# Patient Record
Sex: FEMALE | Race: WHITE | Employment: FULL TIME | URBAN - METROPOLITAN AREA
[De-identification: names, ages, dates, MRNs, and addresses within clinical notes are randomized per-mention and may not be internally consistent; named-entity substitution may affect disease eponyms.]

---

## 2017-08-03 ENCOUNTER — INITIAL CONSULT (OUTPATIENT)
Dept: SURGERY | Age: 27
End: 2017-08-03

## 2017-08-03 VITALS — WEIGHT: 118 LBS | DIASTOLIC BLOOD PRESSURE: 70 MMHG | BODY MASS INDEX: 19.05 KG/M2 | SYSTOLIC BLOOD PRESSURE: 118 MMHG

## 2017-08-03 DIAGNOSIS — K42.9 UMBILICAL HERNIA WITHOUT OBSTRUCTION AND WITHOUT GANGRENE: Primary | ICD-10-CM

## 2017-08-03 PROCEDURE — 99213 OFFICE O/P EST LOW 20 MIN: CPT | Performed by: SURGERY

## 2017-08-03 ASSESSMENT — ENCOUNTER SYMPTOMS
EYES NEGATIVE: 1
ALLERGIC/IMMUNOLOGIC NEGATIVE: 1
ANAL BLEEDING: 1
RECTAL PAIN: 1
RESPIRATORY NEGATIVE: 1
BACK PAIN: 1
ABDOMINAL PAIN: 1

## 2017-08-28 ENCOUNTER — TELEPHONE (OUTPATIENT)
Dept: SURGERY | Age: 27
End: 2017-08-28

## 2017-09-01 ENCOUNTER — HOSPITAL ENCOUNTER (OUTPATIENT)
Dept: SURGERY | Age: 27
Discharge: HOME | End: 2017-09-01
Attending: SURGERY | Admitting: SURGERY

## 2017-09-01 VITALS
OXYGEN SATURATION: 98 % | WEIGHT: 117.95 LBS | HEART RATE: 76 BPM | RESPIRATION RATE: 16 BRPM | SYSTOLIC BLOOD PRESSURE: 94 MMHG | DIASTOLIC BLOOD PRESSURE: 64 MMHG | HEIGHT: 66 IN | BODY MASS INDEX: 18.96 KG/M2 | TEMPERATURE: 97.5 F

## 2017-09-01 LAB — HCG(URINE) PREGNANCY TEST: NEGATIVE

## 2017-09-01 PROCEDURE — 49585 REPAIR UMBILICAL HERN,5+Y/O,REDUC: CPT | Performed by: SURGERY

## 2017-09-01 RX ORDER — CEFAZOLIN SODIUM 2 G/100ML
INJECTION, SOLUTION INTRAVENOUS
Status: DISPENSED
Start: 2017-09-01 | End: 2017-09-01

## 2017-09-01 RX ORDER — FENTANYL CITRATE 50 UG/ML
50 INJECTION, SOLUTION INTRAMUSCULAR; INTRAVENOUS EVERY 5 MIN PRN
Status: DISCONTINUED | OUTPATIENT
Start: 2017-09-01 | End: 2017-09-02 | Stop reason: HOSPADM

## 2017-09-01 RX ORDER — DIPHENHYDRAMINE HYDROCHLORIDE 50 MG/ML
12.5 INJECTION INTRAMUSCULAR; INTRAVENOUS
Status: ACTIVE | OUTPATIENT
Start: 2017-09-01 | End: 2017-09-01

## 2017-09-01 RX ORDER — FENTANYL CITRATE 50 UG/ML
25 INJECTION, SOLUTION INTRAMUSCULAR; INTRAVENOUS EVERY 5 MIN PRN
Status: DISCONTINUED | OUTPATIENT
Start: 2017-09-01 | End: 2017-09-02 | Stop reason: HOSPADM

## 2017-09-01 RX ORDER — HYDROMORPHONE HCL 110MG/55ML
0.25 PATIENT CONTROLLED ANALGESIA SYRINGE INTRAVENOUS EVERY 5 MIN PRN
Status: DISCONTINUED | OUTPATIENT
Start: 2017-09-01 | End: 2017-09-02 | Stop reason: HOSPADM

## 2017-09-01 RX ORDER — MEPERIDINE HYDROCHLORIDE 25 MG/ML
12.5 INJECTION INTRAMUSCULAR; INTRAVENOUS; SUBCUTANEOUS EVERY 5 MIN PRN
Status: DISCONTINUED | OUTPATIENT
Start: 2017-09-01 | End: 2017-09-02 | Stop reason: HOSPADM

## 2017-09-01 RX ORDER — SODIUM CHLORIDE 0.9 % (FLUSH) 0.9 %
10 SYRINGE (ML) INJECTION EVERY 12 HOURS SCHEDULED
Status: DISCONTINUED | OUTPATIENT
Start: 2017-09-01 | End: 2017-09-02 | Stop reason: HOSPADM

## 2017-09-01 RX ORDER — CEFAZOLIN SODIUM 2 G/100ML
2 INJECTION, SOLUTION INTRAVENOUS
Status: COMPLETED | OUTPATIENT
Start: 2017-09-01 | End: 2017-09-01

## 2017-09-01 RX ORDER — PROMETHAZINE HYDROCHLORIDE 25 MG/ML
6.25 INJECTION, SOLUTION INTRAMUSCULAR; INTRAVENOUS EVERY 30 MIN PRN
Status: DISCONTINUED | OUTPATIENT
Start: 2017-09-01 | End: 2017-09-02 | Stop reason: HOSPADM

## 2017-09-01 RX ORDER — HYDROMORPHONE HCL 110MG/55ML
0.5 PATIENT CONTROLLED ANALGESIA SYRINGE INTRAVENOUS EVERY 5 MIN PRN
Status: DISCONTINUED | OUTPATIENT
Start: 2017-09-01 | End: 2017-09-02 | Stop reason: HOSPADM

## 2017-09-01 RX ORDER — SODIUM CHLORIDE 0.9 % (FLUSH) 0.9 %
10 SYRINGE (ML) INJECTION PRN
Status: DISCONTINUED | OUTPATIENT
Start: 2017-09-01 | End: 2017-09-02 | Stop reason: HOSPADM

## 2017-09-01 RX ORDER — LIDOCAINE HYDROCHLORIDE 10 MG/ML
1 INJECTION, SOLUTION EPIDURAL; INFILTRATION; INTRACAUDAL; PERINEURAL
Status: ACTIVE | OUTPATIENT
Start: 2017-09-01 | End: 2017-09-01

## 2017-09-01 RX ORDER — SODIUM CHLORIDE 9 MG/ML
INJECTION, SOLUTION INTRAVENOUS CONTINUOUS
Status: DISCONTINUED | OUTPATIENT
Start: 2017-09-01 | End: 2017-09-02 | Stop reason: HOSPADM

## 2017-09-01 RX ORDER — OXYCODONE HYDROCHLORIDE AND ACETAMINOPHEN 5; 325 MG/1; MG/1
1 TABLET ORAL EVERY 4 HOURS PRN
Qty: 30 TABLET | Refills: 0 | Status: SHIPPED | OUTPATIENT
Start: 2017-09-01 | End: 2017-09-08

## 2017-09-01 RX ADMIN — Medication 0.5 MG: at 11:42

## 2017-09-01 RX ADMIN — CEFAZOLIN SODIUM 2 G: 2 INJECTION, SOLUTION INTRAVENOUS at 10:26

## 2017-09-01 RX ADMIN — Medication 0.5 MG: at 11:56

## 2017-09-01 RX ADMIN — SODIUM CHLORIDE: 9 INJECTION, SOLUTION INTRAVENOUS at 09:31

## 2017-09-01 ASSESSMENT — PAIN DESCRIPTION - PAIN TYPE
TYPE: SURGICAL PAIN
TYPE: SURGICAL PAIN

## 2017-09-01 ASSESSMENT — PAIN SCALES - GENERAL
PAINLEVEL_OUTOF10: 7
PAINLEVEL_OUTOF10: 0
PAINLEVEL_OUTOF10: 0
PAINLEVEL_OUTOF10: 8
PAINLEVEL_OUTOF10: 8
PAINLEVEL_OUTOF10: 0
PAINLEVEL_OUTOF10: 8

## 2017-09-01 ASSESSMENT — PAIN - FUNCTIONAL ASSESSMENT: PAIN_FUNCTIONAL_ASSESSMENT: 0-10

## 2017-09-14 ENCOUNTER — OFFICE VISIT (OUTPATIENT)
Dept: SURGERY | Age: 27
End: 2017-09-14

## 2017-09-14 VITALS — DIASTOLIC BLOOD PRESSURE: 60 MMHG | SYSTOLIC BLOOD PRESSURE: 110 MMHG | WEIGHT: 119 LBS | BODY MASS INDEX: 19.21 KG/M2

## 2017-09-14 DIAGNOSIS — K42.9 UMBILICAL HERNIA WITHOUT OBSTRUCTION AND WITHOUT GANGRENE: Primary | ICD-10-CM

## 2017-09-14 PROCEDURE — 99024 POSTOP FOLLOW-UP VISIT: CPT | Performed by: SURGERY

## 2020-07-07 ENCOUNTER — OFFICE VISIT (OUTPATIENT)
Dept: PRIMARY CARE CLINIC | Age: 30
End: 2020-07-07

## 2020-07-07 PROCEDURE — 99211 OFF/OP EST MAY X REQ PHY/QHP: CPT | Performed by: FAMILY MEDICINE

## 2020-07-07 NOTE — PATIENT INSTRUCTIONS
Steps to help prevent the spread of COVID-19 if you are sick  SOURCE - https://aguirre-lee.info/. html     Stay home except to get medical care   ; Stay home: People who are mildly ill with COVID-19 are able to isolate at home during their illness. You should restrict activities outside your home, except for getting medical care.   ; Avoid public areas: Do not go to work, school, or public areas.   ; Avoid public transportation: Avoid using public transportation, ride-sharing, or taxis.  ; Separate yourself from other people and animals in your home   ; Stay away from others: As much as possible, you should stay in a specific room and away from other people in your home. Also, you should use a separate bathroom, if available.   ; Limit contact with pets & animals: You should restrict contact with pets and other animals while you are sick with COVID-19, just like you would around other people. Although there have not been reports of pets or other animals becoming sick with COVID-19, it is still recommended that people sick with COVID-19 limit contact with animals until more information is known about the virus. ; When possible, have another member of your household care for your animals while you are sick. If you are sick with COVID-19, avoid contact with your pet, including petting, snuggling, being kissed or licked, and sharing food. If you must care for your pet or be around animals while you are sick, wash your hands before and after you interact with pets and wear a facemask. See COVID-19 and Animals for more information. Other considerations   The ill person should eat/be fed in their room if possible. Non-disposable  items used should be handled with gloves and washed with hot water or in a . Clean hands after handling used  items.  If possible, dedicate a lined trash can for the ill person.  Use gloves when removing garbage hands are visibly dirty.   ; Avoid touching: Avoid touching your eyes, nose, and mouth with unwashed hands. Handwashing Tips   ; Wet your hands with clean, running water (warm or cold), turn off the tap, and apply soap.  ; Lather your hands by rubbing them together with the soap. Lather the backs of your hands, between your fingers, and under your nails. ; Scrub your hands for at least 20 seconds. Need a timer? Hum the Carlyle from beginning to end twice.  ; Rinse your hands well under clean, running water.  ; Dry your hands using a clean towel or air dry them. Avoid sharing personal household items   ; Do not share: You should not share dishes, drinking glasses, cups, eating utensils, towels, or bedding with other people or pets in your home.   ; Wash thoroughly after use: After using these items, they should be washed thoroughly with soap and water. Clean all high-touch surfaces everyday   ; Clean and disinfect: Practice routine cleaning of high touch surfaces.  ; High touch surfaces include counters, tabletops, doorknobs, bathroom fixtures, toilets, phones, keyboards, tablets, and bedside tables.  ; Disinfect areas with bodily fluids: Also, clean any surfaces that may have blood, stool, or body fluids on them.   ; Household : Use a household cleaning spray or wipe, according to the label instructions. Labels contain instructions for safe and effective use of the cleaning product including precautions you should take when applying the product, such as wearing gloves and making sure you have good ventilation during use of the product.     Monitor your symptoms   Seek medical attention: Seek prompt medical attention if your illness is worsening     (e.g., difficulty breathing).   ; Call your doctor: Before seeking care, call your healthcare provider and tell them that you have, or are being evaluated for, COVID-19.   ; Wear a facemask when sick: Put on a facemask before you enter the

## 2020-07-07 NOTE — PROGRESS NOTES
Eva Robison received a viral test for COVID-19. They were educated on isolation and quarantine as appropriate. For any symptoms, they were directed to seek care from their PCP, given contact information to establish with a doctor, directed to an urgent care or the emergency room.

## 2020-07-10 LAB
SARS-COV-2: NOT DETECTED
SOURCE: NORMAL

## 2020-09-21 ENCOUNTER — TELEPHONE (OUTPATIENT)
Dept: INTERNAL MEDICINE CLINIC | Age: 30
End: 2020-09-21

## 2020-09-21 NOTE — TELEPHONE ENCOUNTER
----- Message from Blair Rowland sent at 9/21/2020  2:06 PM EDT -----  Subject: Message to Provider    QUESTIONS  Information for Provider? pt wants to know if dr Sierra Gray is willing to   take her on as a new pt. please f/u  ---------------------------------------------------------------------------  --------------  [address]  What is the best way for the office to contact you? OK to leave message on   voicemail  Preferred Call Back Phone Number? [phone]  ---------------------------------------------------------------------------  --------------  SCRIPT ANSWERS  Relationship to Patient?  Self

## 2020-09-25 ENCOUNTER — OFFICE VISIT (OUTPATIENT)
Dept: INTERNAL MEDICINE CLINIC | Age: 30
End: 2020-09-25
Payer: COMMERCIAL

## 2020-09-25 VITALS
TEMPERATURE: 98.2 F | HEART RATE: 80 BPM | DIASTOLIC BLOOD PRESSURE: 62 MMHG | BODY MASS INDEX: 19.61 KG/M2 | SYSTOLIC BLOOD PRESSURE: 114 MMHG | WEIGHT: 122 LBS | HEIGHT: 66 IN

## 2020-09-25 PROCEDURE — 99385 PREV VISIT NEW AGE 18-39: CPT | Performed by: FAMILY MEDICINE

## 2020-09-25 RX ORDER — VARENICLINE TARTRATE
KIT
Qty: 1 BOX | Refills: 0 | Status: SHIPPED | OUTPATIENT
Start: 2020-09-25 | End: 2020-12-02 | Stop reason: ALTCHOICE

## 2020-09-25 RX ORDER — ESCITALOPRAM OXALATE 20 MG/1
20 TABLET ORAL DAILY
COMMUNITY
Start: 2020-09-16

## 2020-09-25 SDOH — ECONOMIC STABILITY: TRANSPORTATION INSECURITY
IN THE PAST 12 MONTHS, HAS THE LACK OF TRANSPORTATION KEPT YOU FROM MEDICAL APPOINTMENTS OR FROM GETTING MEDICATIONS?: NO

## 2020-09-25 SDOH — ECONOMIC STABILITY: TRANSPORTATION INSECURITY
IN THE PAST 12 MONTHS, HAS LACK OF TRANSPORTATION KEPT YOU FROM MEETINGS, WORK, OR FROM GETTING THINGS NEEDED FOR DAILY LIVING?: NO

## 2020-09-25 SDOH — ECONOMIC STABILITY: FOOD INSECURITY: WITHIN THE PAST 12 MONTHS, THE FOOD YOU BOUGHT JUST DIDN'T LAST AND YOU DIDN'T HAVE MONEY TO GET MORE.: NEVER TRUE

## 2020-09-25 SDOH — ECONOMIC STABILITY: INCOME INSECURITY: HOW HARD IS IT FOR YOU TO PAY FOR THE VERY BASICS LIKE FOOD, HOUSING, MEDICAL CARE, AND HEATING?: NOT HARD AT ALL

## 2020-09-25 SDOH — ECONOMIC STABILITY: FOOD INSECURITY: WITHIN THE PAST 12 MONTHS, YOU WORRIED THAT YOUR FOOD WOULD RUN OUT BEFORE YOU GOT MONEY TO BUY MORE.: NEVER TRUE

## 2020-09-25 NOTE — PROGRESS NOTES
Chief Complaint   Patient presents with    Annual Exam     Re-establish care. I saw her for 1 visit in Jan 2014. She has been seeing her OB/gyn physician. PREVENTATIVE VISIT AND EXAM      She is on escitalopram and it is working well. 1 suicide attempt when age 13. She took her nephew in and he is not at all stressful and a model teenager, but communicating with his mother = her sister is very stressful. Her sister is not in a good place right now. She c/o Waking up in the middle of the night gasping for air. She has to tell herself to breathe when she wakes up and uses the term that she has \"sleep apnea. \"     During the day she has to catch herself trying to get a big full breath and some chest pressure. This \"sleep apnea\" did happen 3 days in a row. She was drinking 2 pots of coffee per day. She decided this was not good for her so Cut out caffeinated coffee most of the day. Since she cut the caffeine she is doing better. Still doing a full pot but gradually cutting back and starting to mix decaf with caffeine and weaning down. See ROS. Exercise:  None but has 2 young boys, the youngest is 12 months old. She also does housework.         Patient Active Problem List   Diagnosis    Recurrent major depression in remission ([address]    Umbilical hernia    Umbilical hernia without obstruction and without gangrene       Past Medical History:   Diagnosis Date    Recurrent depression ([address]     Strep throat      Past Surgical History:   Procedure Laterality Date    DENTAL SURGERY      UMBILICAL HERNIA REPAIR  29/88/6861     OPEN UMBILICAL HERNIA REPAIR WITH MESH     Family History   Problem Relation Age of Onset    Stroke Mother     Depression Mother     High Blood Pressure Father     Heart Disease Father         CAD & PAD    Stroke Father     Diabetes Father     Asthma Sister     Migraines Other      Social History     Tobacco Use    Smoking status: Current Every Day Smoker Packs/day: 0.25     Years: 7.00     Pack years: 1.75     Types: Cigarettes    Smokeless tobacco: Never Used    Tobacco comment: 1.5 ppd for 1 year; otherwise lighter. Substance Use Topics    Alcohol use: Yes     Comment: 2-7 drinks a week     Drug use: No       Outpatient Medications Marked as Taking for the 9/25/20 encounter (Office Visit) with Juancarlos Trinh MD   Medication Sig Dispense Refill    escitalopram (LEXAPRO) 20 MG tablet Take 20 mg by mouth daily           ROS:  Full 12 system review done and all negative except for the following:   Unexplained fatigue  Excess daytime sleepiness  Chest pressure  Heart palpitations or racing  Insomnia  Depression, anxiety, worry  Back, neck and joint pain. Her hips tend to lock. Remembers having pain since he was a young teenager. Physical Exam   Vitals:    09/25/20 1425   BP: 114/62   Pulse: 80   Temp: 98.2 °F (36.8 °C)      Body mass index is 19.69 kg/m². Wt Readings from Last 3 Encounters:   09/25/20 122 lb (55.3 kg)   09/14/17 119 lb (54 kg)   09/01/17 117 lb 15.1 oz (53.5 kg)         Constitutional: Oriented to person, place, and time. Appears well-developed and well-nourished. No distress. Thin WF who appears healthy  HENT:   Head: Normocephalic and atraumatic. Ears: Hearing, tympanic membrane, external ear and ear canal normal.   Nose: Nose normal.   Mouth/Throat: Uvula is midline, oropharynx is clear and moist and mucous membranes are normal.   Eyes: Conjunctivae and EOM are normal. Pupils are equal, round, and reactive to light. No scleral icterus. Neck: Normal range of motion. Neck supple. Normal carotid pulses and no JVD present. Carotid bruit is not present. No tracheal deviation present. No mass and no thyromegaly present. Cardiovascular: Normal rate, regular rhythm, S1 normal, S2 normal, normal heart sounds and intact distal pulses. No murmur heard. Pulmonary/Chest: Effort normal and breath sounds normal. No stridor.  No respiratory distress. No decreased breath sounds. No wheezes. No rhonchi. No rales. Abdominal: Soft. Normal appearance and bowel sounds are normal. No distension, no abdominal bruit and no mass. There is no hepatomegaly. No tenderness. Musculoskeletal: Normal range of motion. No edema. Appears flexible. No joint nodules or swelling. Did not check for hyper-flexibility today. Lymphadenopathy:     No cervical adenopathy. No axillary adenopathy. No supraclavicular adenopathy. No Inguinal adenopathy. Neurological: Alert and oriented to person, place, and time. Normal reflexes. No tremor. No cranial nerve deficit. She exhibits normal muscle tone. Coordination and gait normal.   Skin: Skin is warm and dry. No rash noted. Not diaphoretic. No cyanosis. No pallor. Nails show no clubbing. Psychiatric:  Normal mood and affect. Speech is normal and behavior is normal. Cognition and memory are normal.         Assessment:      1. Annual physical exam  She says she is up to date on vaccines and gyn care. - CBC Auto Differential  - Comprehensive Metabolic Panel  - Lipid Panel  - TSH with Reflex    2. Shortness of breath  May be anxiety related. Mostly at night. R/o sleep disordered breathing. 3. Nocturnal dyspnea  - Shira Francis MD, Sleep Medicine, [facility]    4. Tobacco use  She would like to try Chantix. Her gyn would not prescribe due to her h/o depression. She does not feel like she would ever commit suicide due to her kids and . She understands to stop immediately if adverse side effects. Encouraged to DC smoking.   - varenicline (CHANTIX STARTING MONTH GERSON) 0.5 MG X 11 & 1 MG X 42 tablet; Take by mouth. Dispense: 1 box; Refill: 0    5. Bilateral hip pain  Start with x-rays and consider rheumatology consult vs return visit. Outside of today's scope   - XR HIP RIGHT (2-3 VIEWS); Future  - XR HIP LEFT (2-3 VIEWS); Future    6. Arthralgia, unspecified joint  Same as #5. - Sedimentation Rate  - NOY Reflex to Antibody Cascade  - C-Reactive Protein        Health Maintenance Due   Topic Date Due    Varicella vaccine (1 of 2 - 2-dose childhood series) 12/24/1991    Pneumococcal 0-64 years Vaccine (1 of 1 - PPSV23) 12/24/1996    Hepatitis B vaccine (2 of 3 - 3-dose primary series) 04/13/2000    HIV screen  12/24/2005    Cervical cancer screen  12/24/2011    Flu vaccine (1) 09/01/2020     Declines flu vaccine today. Plan:    See orders and patient instructions. Age-appropriate preventative issues discussed and hand out given. An electronic signature was used to authenticate this note.     --Lilliam Gonzalez MD on 9/25/2020

## 2020-09-26 LAB
A/G RATIO: 2.6 (ref 1.1–2.2)
ALBUMIN SERPL-MCNC: 4.6 G/DL (ref 3.4–5)
ALP BLD-CCNC: 83 U/L (ref 40–129)
ALT SERPL-CCNC: 9 U/L (ref 10–40)
ANION GAP SERPL CALCULATED.3IONS-SCNC: 12 MMOL/L (ref 3–16)
ANTI-NUCLEAR ANTIBODY (ANA): NEGATIVE
AST SERPL-CCNC: 20 U/L (ref 15–37)
BASOPHILS ABSOLUTE: 0 K/UL (ref 0–0.2)
BASOPHILS RELATIVE PERCENT: 0.7 %
BILIRUB SERPL-MCNC: <0.2 MG/DL (ref 0–1)
BUN BLDV-MCNC: 11 MG/DL (ref 7–20)
C-REACTIVE PROTEIN: 0.4 MG/L (ref 0–5.1)
CALCIUM SERPL-MCNC: 9.7 MG/DL (ref 8.3–10.6)
CHLORIDE BLD-SCNC: 107 MMOL/L (ref 99–110)
CHOLESTEROL, TOTAL: 128 MG/DL (ref 0–199)
CO2: 26 MMOL/L (ref 21–32)
CREAT SERPL-MCNC: 0.7 MG/DL (ref 0.6–1.1)
EOSINOPHILS ABSOLUTE: 0.1 K/UL (ref 0–0.6)
EOSINOPHILS RELATIVE PERCENT: 2.6 %
GFR AFRICAN AMERICAN: >60
GFR NON-AFRICAN AMERICAN: >60
GLOBULIN: 1.8 G/DL
GLUCOSE BLD-MCNC: 67 MG/DL (ref 70–99)
HCT VFR BLD CALC: 38.5 % (ref 36–48)
HDLC SERPL-MCNC: 63 MG/DL (ref 40–60)
HEMOGLOBIN: 12.9 G/DL (ref 12–16)
LDL CHOLESTEROL CALCULATED: 50 MG/DL
LYMPHOCYTES ABSOLUTE: 1.4 K/UL (ref 1–5.1)
LYMPHOCYTES RELATIVE PERCENT: 27.2 %
MCH RBC QN AUTO: 30.8 PG (ref 26–34)
MCHC RBC AUTO-ENTMCNC: 33.5 G/DL (ref 31–36)
MCV RBC AUTO: 91.9 FL (ref 80–100)
MONOCYTES ABSOLUTE: 0.4 K/UL (ref 0–1.3)
MONOCYTES RELATIVE PERCENT: 7.1 %
NEUTROPHILS ABSOLUTE: 3.3 K/UL (ref 1.7–7.7)
NEUTROPHILS RELATIVE PERCENT: 62.4 %
PDW BLD-RTO: 13.1 % (ref 12.4–15.4)
PLATELET # BLD: 201 K/UL (ref 135–450)
PMV BLD AUTO: 10.3 FL (ref 5–10.5)
POTASSIUM SERPL-SCNC: 4.1 MMOL/L (ref 3.5–5.1)
RBC # BLD: 4.19 M/UL (ref 4–5.2)
SEDIMENTATION RATE, ERYTHROCYTE: 6 MM/HR (ref 0–20)
SODIUM BLD-SCNC: 145 MMOL/L (ref 136–145)
TOTAL PROTEIN: 6.4 G/DL (ref 6.4–8.2)
TRIGL SERPL-MCNC: 75 MG/DL (ref 0–150)
TSH REFLEX: 0.81 UIU/ML (ref 0.27–4.2)
VLDLC SERPL CALC-MCNC: 15 MG/DL
WBC # BLD: 5.2 K/UL (ref 4–11)

## 2020-10-22 ENCOUNTER — OFFICE VISIT (OUTPATIENT)
Dept: PRIMARY CARE CLINIC | Age: 30
End: 2020-10-22
Payer: COMMERCIAL

## 2020-10-22 PROCEDURE — 99211 OFF/OP EST MAY X REQ PHY/QHP: CPT | Performed by: NURSE PRACTITIONER

## 2020-10-22 PROCEDURE — G8420 CALC BMI NORM PARAMETERS: HCPCS | Performed by: NURSE PRACTITIONER

## 2020-10-22 PROCEDURE — G8428 CUR MEDS NOT DOCUMENT: HCPCS | Performed by: NURSE PRACTITIONER

## 2020-10-22 NOTE — PROGRESS NOTES
Maday Baird received a viral test for COVID-19. They were educated on isolation and quarantine as appropriate. For any symptoms, they were directed to seek care from their PCP, given contact information to establish with a doctor, directed to an urgent care or the emergency room.

## 2020-10-23 LAB — SARS-COV-2, NAA: NOT DETECTED

## 2020-10-26 NOTE — RESULT ENCOUNTER NOTE

## 2020-12-02 ENCOUNTER — OFFICE VISIT (OUTPATIENT)
Dept: INTERNAL MEDICINE CLINIC | Age: 30
End: 2020-12-02
Payer: COMMERCIAL

## 2020-12-02 VITALS
TEMPERATURE: 97.1 F | BODY MASS INDEX: 19.69 KG/M2 | DIASTOLIC BLOOD PRESSURE: 66 MMHG | WEIGHT: 122 LBS | SYSTOLIC BLOOD PRESSURE: 118 MMHG | HEART RATE: 96 BPM

## 2020-12-02 PROCEDURE — 99213 OFFICE O/P EST LOW 20 MIN: CPT | Performed by: FAMILY MEDICINE

## 2020-12-02 PROCEDURE — 4004F PT TOBACCO SCREEN RCVD TLK: CPT | Performed by: FAMILY MEDICINE

## 2020-12-02 PROCEDURE — G8427 DOCREV CUR MEDS BY ELIG CLIN: HCPCS | Performed by: FAMILY MEDICINE

## 2020-12-02 PROCEDURE — G8420 CALC BMI NORM PARAMETERS: HCPCS | Performed by: FAMILY MEDICINE

## 2020-12-02 PROCEDURE — 99406 BEHAV CHNG SMOKING 3-10 MIN: CPT | Performed by: FAMILY MEDICINE

## 2020-12-02 PROCEDURE — G8484 FLU IMMUNIZE NO ADMIN: HCPCS | Performed by: FAMILY MEDICINE

## 2020-12-02 NOTE — PATIENT INSTRUCTIONS
I have reviewed and agree with the treatment and plan of care provided by the PT student.  KRISTOPHER MartinesT    Talk to your gynecologist about staying on the Lexapro. Prozac = fluoxetine has the most study for pregnancy. National Pregnancy Registry [phone] if you stay on medication. Will want to wean off by the third trimester. Giovanny Bogaert syndrome may need to be considered. We have rheumatologist in the office that could consult for this. Lexapro. Try alternating 1 pill one day and 1/2 pill the next day. Patient Education         Beat Your Smoking Triggers (02:09)  Your health professional recommends that you watch this short online health video. Learn how to get past those things that make you want to smoke by replacing them with healthier things. How to watch the video    Scan the QR code   OR Visit the website    https://NATIONSPLAY/r/[email or web address]   Current as of: March 12, 2020               Content Version: 12.6  © 8515-7165 [employer]. Care instructions adapted under license by [employer] ([facility]). If you have questions about a medical condition or this instruction, always ask your healthcare professional. [address] any warranty or liability for your use of this information. Patient Education         Quit Smoking: How to Tell Your Friends (04:47)  Your health professional recommends that you watch this short online health video. Practice talking to your smoking friends about your decision to quit. How to watch the video    Scan the QR code   OR Visit the website    https://NATIONSPLAY/r/[email or web address]   Current as of: March 12, 2020               Content Version: 12.6  © 2006-2020 [employer]. Care instructions adapted under license by [employer] ([facility]). If you have questions about a medical condition or this instruction, always ask your healthcare professional. [address] any warranty or liability for your use of this information. Patient Education         Quitting Smoking:  It May Take Many Tries (01:54)  Your health professional recommends that you watch this short online health video. Learn how it may take a few attempts before you quit smoking for good. How to watch the video    Scan the QR code   OR Visit the website    https://[email or web address]/[email or web address]   Current as of: March 12, 2020               Content Version: 12.6  © 2006-2020 [employer]. Care instructions adapted under license by [employer] ([facility]). If you have questions about a medical condition or this instruction, always ask your healthcare professional. [address] any warranty or liability for your use of this information. Patient Education         See Yourself as a Nonsmoker (02:54)  Your health professional recommends that you watch this short online health video. Imagine your life without cigarettes. How to watch the video    Scan the QR code   OR Visit the website    https://[email or web address]   Current as of: March 12, 2020               Content Version: 12.6  © 2006-2020 [employer]. Care instructions adapted under license by [employer] ([facility]). If you have questions about a medical condition or this instruction, always ask your healthcare professional. [address] any warranty or liability for your use of this information.

## 2020-12-02 NOTE — PROGRESS NOTES
Subjective:      Patient ID: Nita Hennessy is a 34 y.o. female. HPI   Chief Complaint   Patient presents with    Follow-up     pregnant      Cut out all her caffeine and her sleep symptoms went away. Did not get the hip x-ray due to busyness. Now pregnant. She does not think she needs a sleep study since symptoms resolved. Her sister passed away (her nephew's mother) unexpectedly. Lots of stress and things happening that are taking a lot of her time. They are grieving her death but her nephew is taking some consolation in that she did not die of a drug overdose. .    The down fall of being pregnant is not being able to complete the Chantix. Chantix is tolerated if she is able to eat with it, but it was making her sick if she tried to take on empty stomach. She is having some morning sickness but no vomiting. Has appt with OB this week. She is on Lexapro. 20 mg daily. She was able to not take SSRI with other pregnancies but did need it again right after delivery. She is still having some bilateral hip pain that is noticeable during intercourse and this bothers her and spouse. She has always been very flexible. \"people freak out when they see how far I can bend my hand back. \"    See Assessment for more detail on conditions addressed today.     Patient Active Problem List   Diagnosis    Recurrent major depression in remission [address]    Umbilical hernia    Umbilical hernia without obstruction and without gangrene         Outpatient Medications Marked as Taking for the 12/2/20 encounter (Office Visit) with Damien Aggarwal MD   Medication Sig Dispense Refill    escitalopram (LEXAPRO) 20 MG tablet Take 20 mg by mouth daily           Social History     Tobacco Use    Smoking status: Current Every Day Smoker     Packs/day: 0.50     Years: 15.00     Pack years: 7.50     Types: Cigarettes     Start date: 12/24/2005    Smokeless tobacco: Never Used    Tobacco comment: 1.5 ppd for 1 yr; and benefits of cessation. Time spent (minutes): 3-7 minutes. Suggested cold turkey stopping.

## 2022-01-04 ENCOUNTER — TELEPHONE (OUTPATIENT)
Dept: INTERNAL MEDICINE CLINIC | Age: 32
End: 2022-01-04

## 2022-01-04 DIAGNOSIS — J02.9 SORE THROAT: Primary | ICD-10-CM

## 2022-01-04 NOTE — TELEPHONE ENCOUNTER
Patient advised through VM that unfortunately we do not have Strep tests right now as they are on back order. Advised to contact [facility] or urgent care to schedule or she can contact us tomorrow to see if we received the shipment yet.

## 2022-01-04 NOTE — TELEPHONE ENCOUNTER
Thena Moritz is also causing a lot of sore throats and red throats and is mimicking Strep. There is no way we can know if it is COVID or Strep or other virus without testing. Sylvester Ford Unfortunately they took our rapid strep kits away from the office and I don't think the [facility] flu clinics are doing Strep tests, SO She may wish to go to the [address] [place] [address] to be tested for both. I will put in an order for COVID and Strep test or culture at the [facility], but not sure they do the strep test there.

## 2022-01-04 NOTE — TELEPHONE ENCOUNTER
Pt calling has strep--red sore throat--white spots all over---needs to be tested -treated--no openings today---what can we do? Please call pt. Thanks.

## 2022-06-06 ENCOUNTER — OFFICE VISIT (OUTPATIENT)
Dept: INTERNAL MEDICINE CLINIC | Age: 32
End: 2022-06-06
Payer: COMMERCIAL

## 2022-06-06 VITALS
HEART RATE: 72 BPM | SYSTOLIC BLOOD PRESSURE: 90 MMHG | DIASTOLIC BLOOD PRESSURE: 62 MMHG | BODY MASS INDEX: 20.89 KG/M2 | WEIGHT: 130 LBS | HEIGHT: 66 IN

## 2022-06-06 DIAGNOSIS — M25.511 CHRONIC RIGHT SHOULDER PAIN: Primary | ICD-10-CM

## 2022-06-06 DIAGNOSIS — G89.29 CHRONIC RIGHT SHOULDER PAIN: Primary | ICD-10-CM

## 2022-06-06 DIAGNOSIS — R53.82 CHRONIC FATIGUE: ICD-10-CM

## 2022-06-06 PROCEDURE — 99214 OFFICE O/P EST MOD 30 MIN: CPT | Performed by: FAMILY MEDICINE

## 2022-06-06 ASSESSMENT — PATIENT HEALTH QUESTIONNAIRE - PHQ9
6. FEELING BAD ABOUT YOURSELF - OR THAT YOU ARE A FAILURE OR HAVE LET YOURSELF OR YOUR FAMILY DOWN: 0
SUM OF ALL RESPONSES TO PHQ QUESTIONS 1-9: 0
4. FEELING TIRED OR HAVING LITTLE ENERGY: 0
SUM OF ALL RESPONSES TO PHQ9 QUESTIONS 1 & 2: 0
3. TROUBLE FALLING OR STAYING ASLEEP: 0
9. THOUGHTS THAT YOU WOULD BE BETTER OFF DEAD, OR OF HURTING YOURSELF: 0
SUM OF ALL RESPONSES TO PHQ QUESTIONS 1-9: 0
5. POOR APPETITE OR OVEREATING: 0
10. IF YOU CHECKED OFF ANY PROBLEMS, HOW DIFFICULT HAVE THESE PROBLEMS MADE IT FOR YOU TO DO YOUR WORK, TAKE CARE OF THINGS AT HOME, OR GET ALONG WITH OTHER PEOPLE: 0
1. LITTLE INTEREST OR PLEASURE IN DOING THINGS: 0
SUM OF ALL RESPONSES TO PHQ QUESTIONS 1-9: 0
7. TROUBLE CONCENTRATING ON THINGS, SUCH AS READING THE NEWSPAPER OR WATCHING TELEVISION: 0
SUM OF ALL RESPONSES TO PHQ QUESTIONS 1-9: 0
2. FEELING DOWN, DEPRESSED OR HOPELESS: 0
8. MOVING OR SPEAKING SO SLOWLY THAT OTHER PEOPLE COULD HAVE NOTICED. OR THE OPPOSITE, BEING SO FIGETY OR RESTLESS THAT YOU HAVE BEEN MOVING AROUND A LOT MORE THAN USUAL: 0

## 2022-06-06 NOTE — PROGRESS NOTES
Last visit in my office 2022    Donte Arredondo (:  1990) is a 32 y.o. female, here for evaluation of the following chief complaint(s):  Joint Pain        ASSESSMENT/PLAN:    1. Chronic right shoulder pain  This needs specialty care. No MRI on file in chart with [facility] or outside [address] [place] [address] MD Amadou, Orthopedic Surgery, [facility]  - MRI SHOULDER RIGHT WO CONTRAST; Future    2. Chronic fatigue  Will start work up. No need for B12 injections if does not have pernicious anemia. Consider work up for immune issues but I have not seen her with any unusual infections and does not appear to be in touch with health care for repeated high frequency of infection.    - CBC with Auto Differential; Future  - Comprehensive Metabolic Panel; Future  - TSH with Reflex; Future  - Vitamin B12; Future  - Vitamin D 25 Hydroxy; Future  - Iron and TIBC; Future    FOLLOW UP:  Return in about 2 months (around 2022) for follow up on fatigue and shoulder. Vin Carias HPI  Same day visit due to right shoulder pain. This is a chronic long standing pain. Partial torn rotator cuff diagnosed in the past.  She did not like the orthopedic doctor. He told her that her shoulder was repeatedly dislocating. He actually pulled it out of socket to show her boyfriend how it reacted. This caused her a lot of pain. He did not seem sympathetic. He told her to go home and do some home exercises and did not think she should have surgery. The shoulder pain is worse lately because she is again nursing and holding a baby. It is really painful in the right anterior shoulder. She has full range of motion but it hurts to do just about everything. Is tired. Her mother-in-law suggested she get B12 shots. She has been tired just about all of her life. She thinks it is more severe than a typical person. Her baby is not a good sleeper.   She thinks it is more than just being tired from not getting a good night sleep with her children. Also think she has a problem with her immune system. She states she gets repeated infections. She used to get strep throat all the time. Now she says she is getting a cold from her kids repeatedly over and over and suggests that as soon as she gets better at 2 weeks she gets sick all over again. Outpatient Medications Marked as Taking for the 6/6/22 encounter (Office Visit) with Saran Felix MD   Medication Sig Dispense Refill    escitalopram (LEXAPRO) 20 MG tablet Take 20 mg by mouth daily         Review of Systems    OBJECTIVE:    Vitals:    06/06/22 1632   BP: 90/62   Pulse: 72   Weight: 130 lb (59 kg)   Height: 5' 6\" (1.676 m)       Physical Exam  Vitals reviewed. Constitutional:       General: She is not in acute distress. Appearance: Normal appearance. She is well-developed. She is not ill-appearing or diaphoretic. HENT:      Head:      Comments: Did not sound congested and no coughing. Does not appear ill. Eyes:      General: No scleral icterus. Neck:      Thyroid: No thyroid mass or thyromegaly. Vascular: No carotid bruit. Cardiovascular:      Rate and Rhythm: Normal rate and regular rhythm. Heart sounds: Normal heart sounds, S1 normal and S2 normal. No murmur heard. Pulmonary:      Effort: Pulmonary effort is normal. No respiratory distress. Breath sounds: Normal breath sounds. No decreased breath sounds, wheezing, rhonchi or rales. Abdominal:      General: Bowel sounds are normal. There is no abdominal bruit. Palpations: Abdomen is soft. There is no hepatomegaly or mass. Tenderness: There is no abdominal tenderness. Musculoskeletal:      Right shoulder: Tenderness and bony tenderness present. Decreased range of motion (Very hesitant with abduction and does not get up to 180 degrees. Other range of motion is full but painful). Left shoulder: Normal.      Cervical back: Neck supple.  Tenderness present. No spasms, torticollis, bony tenderness or crepitus. Pain with movement present. Normal range of motion. Right lower leg: No edema. Left lower leg: No edema. Comments: Tenderness is on the right side and tends to go to the trapezius and down around the shoulder. It may have to do with shoulder pathology. Negative Spurling and axial loading   Lymphadenopathy:      Cervical: No cervical adenopathy. Skin:     General: Skin is warm and dry. Coloration: Skin is not pale. Nails: There is no clubbing. Neurological:      Mental Status: She is alert and oriented to person, place, and time. Motor: No tremor or abnormal muscle tone. Coordination: Coordination normal.      Gait: Gait normal.   Psychiatric:         Speech: Speech normal.         Behavior: Behavior normal.           An electronic signature was used to authenticate this note.     Adolfo Sauceda MD

## 2022-06-07 ENCOUNTER — TELEPHONE (OUTPATIENT)
Dept: INTERNAL MEDICINE CLINIC | Age: 32
End: 2022-06-07

## 2022-06-07 NOTE — TELEPHONE ENCOUNTER
Pt calling saw you yesterday for shoulder pain---asking if you could give her a muscle relaxer for her shoulder---do you think that would help her? Please call her at  [phone]. Thanks.

## 2022-06-07 NOTE — TELEPHONE ENCOUNTER
We can try one at bedtime but not sure if it will help or not. It is not known if it gets into breast milk. Watch your baby for sedation /  Too much sleepiness.

## 2022-06-08 RX ORDER — METHOCARBAMOL 750 MG/1
750 TABLET, FILM COATED ORAL NIGHTLY PRN
Qty: 30 TABLET | Refills: 1 | Status: SHIPPED | OUTPATIENT
Start: 2022-06-08 | End: 2022-06-15 | Stop reason: ALTCHOICE

## 2022-06-10 ENCOUNTER — TELEPHONE (OUTPATIENT)
Dept: INTERNAL MEDICINE CLINIC | Age: 32
End: 2022-06-10

## 2022-06-10 DIAGNOSIS — M25.511 CHRONIC RIGHT SHOULDER PAIN: Primary | ICD-10-CM

## 2022-06-10 DIAGNOSIS — G89.29 CHRONIC RIGHT SHOULDER PAIN: Primary | ICD-10-CM

## 2022-06-10 RX ORDER — DICLOFENAC SODIUM 75 MG/1
75 TABLET, DELAYED RELEASE ORAL 2 TIMES DAILY PRN
Qty: 30 TABLET | Refills: 0 | Status: SHIPPED | OUTPATIENT
Start: 2022-06-10 | End: 2022-06-15 | Stop reason: ALTCHOICE

## 2022-06-10 NOTE — TELEPHONE ENCOUNTER
Patient states Dr Ulyess Gosselin prescribed a muscle relaxer for her right shoulder pain. She said it doesn't seem to be working and she is experiencing a lot of pain. She ask if there is another medication Dr Ulyess Gosselin can give her for pain before she sees orthopedic physician for her appointment on Monday 6/13.

## 2022-06-13 ENCOUNTER — OFFICE VISIT (OUTPATIENT)
Dept: ORTHOPEDIC SURGERY | Age: 32
End: 2022-06-13
Payer: COMMERCIAL

## 2022-06-13 VITALS — WEIGHT: 131 LBS | BODY MASS INDEX: 21.05 KG/M2 | HEIGHT: 66 IN

## 2022-06-13 DIAGNOSIS — S43.001S SHOULDER SUBLUXATION, RIGHT, SEQUELA: ICD-10-CM

## 2022-06-13 DIAGNOSIS — M25.511 RIGHT SHOULDER PAIN, UNSPECIFIED CHRONICITY: Primary | ICD-10-CM

## 2022-06-13 DIAGNOSIS — M54.12 CERVICAL RADICULOPATHY: ICD-10-CM

## 2022-06-13 PROCEDURE — 99204 OFFICE O/P NEW MOD 45 MIN: CPT | Performed by: ORTHOPAEDIC SURGERY

## 2022-06-13 NOTE — LETTER
[facility]  [address]  [address]  Phone: [phone]  Fax: [phone]           Anastacia Mendoza MD      June 13, 2022     Patient: Phillip Ivey   MR Number: [id number]   YOB: 1990   Date of Visit: 6/13/2022       Dear Dr. Nathaly Collier: Thank you for referring Conor Soriano to me for evaluation/treatment. Below are the relevant portions of my assessment and plan of care. If you have questions, please do not hesitate to call me. I look forward to following Debo along with you.     Sincerely,        Anastacia Mendoza MD    CC providers:  Monse Whitt MD  [address] [id number]  [address]

## 2022-06-13 NOTE — PROGRESS NOTES
ORTHOPAEDIC CONSULTATION NOTE    Chief Complaint   Patient presents with   [facility] Patient     NP [address]       HPI  6/13/22  32 y.o. 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  She reports she saw a doctor back then and was told she had [place] damage\"  She also reports she had an MRI of the right shoulder back then, but she does not recall where that was done  She did not have any formal treatment at that point, no formal physical therapy, no surgery  Since that injury 9 years ago, she denies overt dislocation episodes  Has never had to have her right shoulder reduced in the emergency room  She reports pain \"everywhere\"  When asked to be more specific, she reports the worst pain is over her right trapezius  She does describe pain that radiates down her right upper extremity into the hand  This is associated with numbness and tingling involving the middle finger and ring finger  She also has pain that radiates down her back as well as over the posterior shoulder region    I have reviewed and discussed the below pain assessment findings with the patient.   Pain Assessment  Location of Pain: Shoulder  Location Modifiers: Right  Severity of Pain: 8  Quality of Pain: Dull,Aching,Locking,Popping,Cracking,Grinding  Duration of Pain: Persistent  Frequency of Pain: Constant  Aggravating Factors: Stretching  Limiting Behavior: Some  Result of Injury: Yes  Work-Related Injury: No  Are there other pain locations you wish to document?: No    Review of Systems  I have read over the ROS from the Patient History Form dated on 6/13/22  Pertinent positives include hemorrhoids, bleeding with BM, neck pain, chronic pain, depression  Rest of 13 point ROS otherwise negative except per HPI, and scanned into the patient's chart under the Media tab. Allergies   Allergen Reactions    Hydrocodone-Acetaminophen         Current Outpatient Medications   Medication Sig Dispense Refill    methocarbamol (ROBAXIN-750) 750 MG tablet Take 1 tablet by mouth nightly as needed (muscle spasms) 30 tablet 1    escitalopram (LEXAPRO) 20 MG tablet Take 20 mg by mouth daily      diclofenac (VOLTAREN) 75 MG EC tablet Take 1 tablet by mouth 2 times daily as needed for Pain (Patient not taking: Reported on 6/13/2022) 30 tablet 0     No current facility-administered medications for this visit. Past Medical History:   Diagnosis Date    Blood type O+     Recurrent depression ([facility] [address]         Past Surgical History:   Procedure Laterality Date    DENTAL SURGERY      UMBILICAL HERNIA REPAIR  57/38/4264     OPEN UMBILICAL HERNIA REPAIR WITH MESH       Family History   Problem Relation Age of Onset   [facility] Stroke Mother     Depression Mother    [facility] Breast Cancer Mother         older age and no FH    High Blood Pressure Father     Heart Disease Father         CAD & PAD    Stroke Father     Diabetes Father     Asthma Sister     COPD Sister         possibly asthma also    Heart Disease Paternal Grandfather     Diabetes Paternal Grandfather         multiple paternal members    Migraines Other        Social History     Socioeconomic History    Marital status:      Spouse name: Anila Benitez    Number of children: 0    Years of education: 15    Highest education level: Not on file   Occupational History    Occupation: h/o      Comment: [address] Occupation: history of CNA (nursing aide)    Occupation: stay at home mother   Tobacco Use    Smoking status: Current Every Day Smoker     Packs/day: 0.50     Years: 15.00     Pack years: 7.50     Types: Cigarettes     Start date: 12/24/2005    Smokeless tobacco: Never Used    Tobacco comment: 1.5 ppd for 1 yr; otherwise lighter.    Vaping Use    Vaping Use: Never used Substance and Sexual Activity    Alcohol use: Not Currently     Comment: h/o 2-7 drinks a week     Drug use: No    Sexual activity: Yes     Partners: Male   Other Topics Concern    Not on file   Social History Narrative     to Milagros Childs's son. Her nephew moved in with them 2020. Born in 2003. Social Determinants of Health     Financial Resource Strain:     Difficulty of Paying Living Expenses: Not on file   Food Insecurity:     Worried About Running Out of Food in the Last Year: Not on file    Marta of Food in the Last Year: Not on file   Transportation Needs:     Lack of Transportation (Medical): Not on file    Lack of Transportation (Non-Medical): Not on file   Physical Activity:     Days of Exercise per Week: Not on file    Minutes of Exercise per Session: Not on file   Stress:     Feeling of Stress : Not on file   Social Connections:     Frequency of Communication with Friends and Family: Not on file    Frequency of Social Gatherings with Friends and Family: Not on file    Attends Rastafarian Services: Not on file    Active Member of [address] [place] [address] or Organizations: Not on file    Attends Club or Organization Meetings: Not on file    Marital Status: Not on file   Intimate Partner Violence:     Fear of Current or Ex-Partner: Not on file    Emotionally Abused: Not on file    Physically Abused: Not on file    Sexually Abused: Not on file   Housing Stability:     Unable to Pay for Housing in the Last Year: Not on file    Number of [place] in the Last Year: Not on file    Unstable Housing in the Last Year: Not on file        Vitals:    06/13/22 1312   Weight: 131 lb (59.4 kg)   Height: 5' 6\" (1.676 m)       Physical Exam  Constitutional - well-groomed, well-nourished, Body mass index is 21.14 kg/m².   Psychiatric - pleasant, normal mood & affect  Cardiovascular - RRR, negative UE peripheral edema, radial pulse 2+  Respiratory - respirations unlabored, on room air; mask on  Skin - no rashes, wounds, or lesions seen on exposed skin  Neck - full ROM, TTP cervical spinous processes, TTP right paraspinal musculature, positive Spurling's to right, equivocal Lhermitte sign. Neurological - RUE SILT M/U/R/A/LABC nerve distributions; AIN/PIN/IO intact  Right shoulder:   No obvious deformity/swelling/ecchymosis   No atrophy seen    No TTP of shoulder    No evidence of scapular winging with protraction and retraction testing   Range of Motion: Forward flexion/scaption:  160 dysrhythmia    Abduction:  170 dysrhythmia    External rotation with arm at side:  70    Internal rotation:  T7   Strength:    Abduction:  5/5     External rotation:  5/5    Special tests:    negative lift-off sign    equivocal belly-press test    positive Hawkin's test    Negative apprehension sign    Negative relocation sign    Positive sulcus sign bilaterally    Beighton score: One point if while standing, you can place palms on the ground with knees straight = 1  One point for each elbow that hyperextends (>10°) = 0  One point for each knee that hyperextends (>10°) = 2  One point for each thumb that can be passively abducted onto the forearm = 0  One point for each small finger that hyperextends >90° = 0        Imaging:  Images were personally reviewed by myself and discussed with the patient  Right shoulder 4 views performed 6/13/22 - glenohumeral articular height is preserved, there are no loose bodies appreciated. Asnket's line is preserved. On axillary view, the humeral head is well-centered within the glenoid. The acromioclavicular joint demonstrates no significant degenerative changes. The tuberosities are normal in appearance. Glenoid morphology appears preserved, no obvious evidence of previous glenoid rim fracture or bone loss. Assessment & Plan:  32 y.o. female who presents with    Diagnosis Orders   1. Right shoulder pain, unspecified chronicity  XR SHOULDER RIGHT (MIN 2 VIEWS)   2.

## 2022-06-15 ENCOUNTER — OFFICE VISIT (OUTPATIENT)
Dept: ORTHOPEDIC SURGERY | Age: 32
End: 2022-06-15

## 2022-06-15 VITALS — WEIGHT: 130.95 LBS | BODY MASS INDEX: 21.05 KG/M2 | HEIGHT: 66 IN

## 2022-06-15 DIAGNOSIS — M35.7 BENIGN HYPERMOBILITY SYNDROME: ICD-10-CM

## 2022-06-15 DIAGNOSIS — M54.2 NECK PAIN: ICD-10-CM

## 2022-06-15 DIAGNOSIS — M50.20 CERVICAL DISCOGENIC PAIN SYNDROME: ICD-10-CM

## 2022-06-15 DIAGNOSIS — M54.12 RADICULITIS OF RIGHT CERVICAL REGION: Primary | ICD-10-CM

## 2022-06-15 PROCEDURE — 99203 OFFICE O/P NEW LOW 30 MIN: CPT | Performed by: INTERNAL MEDICINE

## 2022-06-15 RX ORDER — MELOXICAM 15 MG/1
15 TABLET ORAL DAILY PRN
Qty: 30 TABLET | Refills: 2 | Status: SHIPPED | OUTPATIENT
Start: 2022-06-15

## 2022-06-15 RX ORDER — METHYLPREDNISOLONE 4 MG/1
TABLET ORAL
Qty: 1 KIT | Refills: 0 | Status: SHIPPED | OUTPATIENT
Start: 2022-06-15 | End: 2022-07-12 | Stop reason: ALTCHOICE

## 2022-06-15 RX ORDER — CYCLOBENZAPRINE HCL 10 MG
10 TABLET ORAL NIGHTLY PRN
Qty: 30 TABLET | Refills: 1 | Status: SHIPPED | OUTPATIENT
Start: 2022-06-15

## 2022-06-15 NOTE — PROGRESS NOTES
Chief Complaint:   Chief Complaint   Patient presents with    Neck Pain     Cervical pain that has been going on for about 15 years since an accident. She has stabbing and radiating pain that will go down both shoulders. R shoulder is the side she has increased symptoms and has more occurance. \"feels like liquid lava\" on her neck at times.  Shoulder Pain     R>L shoulder sharp, burning, pins and needles pain down shoulders and arm          History of Present Illness:       Patient is a 32 y.o. female presents with the above complaint. She is seen in consultation at request of Dr. Jain Levels the symptoms began 1 plus years ago. The patient describes a sharp, aching, burning pain that does radiate. The symptoms are constant  and are are worsening since the onset. The neck pain is location: posterior and right sided  severity: 5 out of 10 and is worsened by increased activity levels. The patient has not noted new onset or progressive weakness of the upper extremites. The neck pain : arm pain is 50 : 50 and follows a C6/C7 dermatomal pattern radiating into the hand. The arm pain is described as neuritic inclusive of numbness pins-and-needles stabbing in quality    Treatment to date has included chiropractor/manipulation and symptoms have not improved with this treatment. The patient denies history of recent neck trauma. Their is not history or orthopaedic cervical spine surgery. Work up to date has included: none    The patient has no prior history of autoimmune disease, inflammatory arthropathy or crystal arthropathy.              Past Medical History:        Past Medical History:   Diagnosis Date    Blood type O+     Recurrent depression ([facility] [address]          Past Surgical History:   Procedure Laterality Date    DENTAL SURGERY      UMBILICAL HERNIA REPAIR  98/16/6681     OPEN UMBILICAL HERNIA REPAIR WITH MESH         Present Medications:         Current Outpatient Medications   Medication Sig Dispense Refill    methylPREDNISolone (MEDROL, GERSON,) 4 MG tablet By mouth. 1 kit 0    meloxicam (MOBIC) 15 MG tablet Take 1 tablet by mouth daily as needed for Pain After completing Medrol 30 tablet 2    cyclobenzaprine (FLEXERIL) 10 MG tablet Take 1 tablet by mouth nightly as needed for Muscle spasms 30 tablet 1    escitalopram (LEXAPRO) 20 MG tablet Take 20 mg by mouth daily       No current facility-administered medications for this visit. Allergies: Allergies   Allergen Reactions    Hydrocodone-Acetaminophen         Social History:         Social History     Socioeconomic History    Marital status:      Spouse name: Bere Cabrera    Number of children: 0    Years of education: 15    Highest education level: Not on file   Occupational History    Occupation: h/o      Comment: [address] Occupation: history of CNA (nursing aide)    Occupation: stay at home mother   Tobacco Use    Smoking status: Current Every Day Smoker     Packs/day: 0.50     Years: 15.00     Pack years: 7.50     Types: Cigarettes     Start date: 12/24/2005    Smokeless tobacco: Never Used    Tobacco comment: 1.5 ppd for 1 yr; otherwise lighter. Vaping Use    Vaping Use: Never used   Substance and Sexual Activity    Alcohol use: Not Currently     Comment: h/o 2-7 drinks a week     Drug use: No    Sexual activity: Yes     Partners: Male   Other Topics Concern    Not on file   Social History Narrative     to Demian Childs's son. Her nephew moved in with them 2020. Born in 2003. Social Determinants of Health     Financial Resource Strain:     Difficulty of Paying Living Expenses: Not on file   Food Insecurity:     Worried About Running Out of Food in the Last Year: Not on file    Marta of Food in the Last Year: Not on file   Transportation Needs:     Lack of Transportation (Medical): Not on file    Lack of Transportation (Non-Medical):  Not on file   Physical Activity:  Days of Exercise per Week: Not on file    Minutes of Exercise per Session: Not on file   Stress:     Feeling of Stress : Not on file   Social Connections:     Frequency of Communication with Friends and Family: Not on file    Frequency of Social Gatherings with Friends and Family: Not on file    Attends Anglican Services: Not on file    Active Member of [address] [place] [address] or Organizations: Not on file    Attends Club or Organization Meetings: Not on file    Marital Status: Not on file   Intimate Partner Violence:     Fear of Current or Ex-Partner: Not on file    Emotionally Abused: Not on file    Physically Abused: Not on file    Sexually Abused: Not on file   Housing Stability:     Unable to Pay for Housing in the Last Year: Not on file    Number of [place] in the Last Year: Not on file    Unstable Housing in the Last Year: Not on file        Review of Symptoms:    Pertinent items are noted in HPI    Review of systems reviewed from Patient History Form dated on today's date and available in the patient's chart under the Media tab. Vital Signs: There were no vitals filed for this visit. General Exam:     Constitutional: Patient is adequately groomed with no evidence of malnutrition  Mental Status: The patient is oriented to time, place and person. The patient's mood and affect are appropriate. Vascular: Examination reveals no swelling or calf tenderness. Peripheral pulses are palpable and 2+. Lymphatics: no lymphadenopathy of the cervical or axillary regions or upper extremity      Physical Exam: Cervical neck      Primary Exam:    Inspection: No deformity atrophy appreciable curvature      Palpation: No focal trigger point tenderness      Range of Motion: Near full range in all planes      Strength: Normal upper extremity      Special Tests: Negative Flores's      Skin: There are no rashes, ulcerations or lesions.       Gait: Nonantalgic      Reflex intact upper     Additional Comments:        Additional Examinations:           Neurologic -Light touch sensation and manual muscle testing is normal C5-C8 . Biceps and triceps reflexes are symmetric and +2. Spurlling sign is negative            Office Imaging Results/Procedures PerformedToday:      Radiology:      X-rays obtained and reviewed in office:   Views 2 views cervical spine   Location normal alignment on the AP projection   Impression there is a focus of endplate irregularity at the far anterior superior endplate C5 that likely represents anatomic variant no suspicion for fracture. No areas of focal events intervertebral to space narrowing or high-grade spondylosis       Office Procedures:     Orders Placed This Encounter   Procedures    XR CERVICAL SPINE (2-3 VIEWS)     Standing Status:   Future     Number of Occurrences:   1     Standing Expiration Date:   6/15/2023     Order Specific Question:   Reason for exam:     Answer:   pain    MRI CERVICAL SPINE WO CONTRAST     Standing Status:   Future     Standing Expiration Date:   6/15/2023     Scheduling Instructions:      Flori Clements, please call pt to schedule, [facility] will obtain auth and fwd to your facility. Pt advised to f/u in clinic 2-3 days after MRI for results. Order Specific Question:   Reason for exam:     Answer:   R/O HNP / STENOSIS     Order Specific Question:   What is the sedation requirement? Answer:   None           Other Outside Imaging and Testing Personally Reviewed:    XR CERVICAL SPINE (2-3 VIEWS)    Result Date: 6/15/2022  Radiology exam is complete. No Radiologist dictation. Please follow up with ordering provider. XR SHOULDER RIGHT (MIN 2 VIEWS)    Result Date: 6/13/2022  Radiology exam is complete. No Radiologist dictation. Please follow up with ordering provider. Assessment   Impression: . Encounter Diagnoses   Name Primary?     Radiculitis of right cervical region Yes    Cervical discogenic pain syndrome     Benign hypermobility syndrome     Neck pain               Plan:   MRI cervical spine evaluate severity of discopathy/ stenosis suspected clinically  Consider Her a candidate for spine intervention injection  Activity modification, cervical spine precautions  cervical spinestabilization home exercise program  Medical pain management: Medrol followed by meloxicam with GI precaution and as needed use of Flexeril      The nature of the finding, probable diagnosis and likely treatment was thoroughly discussed with the patient. The options, risks, complications, alternative treatment as well as some of the differential diagnosis was discussed. The patient was thoroughly informed and all questions were answered. the patient indicated understanding and satisfaction with the discussion. Orders:        Orders Placed This Encounter   Procedures    XR CERVICAL SPINE (2-3 VIEWS)     Standing Status:   Future     Number of Occurrences:   1     Standing Expiration Date:   6/15/2023     Order Specific Question:   Reason for exam:     Answer:   pain    MRI CERVICAL SPINE WO CONTRAST     Standing Status:   Future     Standing Expiration Date:   6/15/2023     Scheduling Instructions:      Rosa Rodriguez, please call pt to schedule, [facility] will obtain auth and fwd to your facility. Pt advised to f/u in clinic 2-3 days after MRI for results. Order Specific Question:   Reason for exam:     Answer:   R/O HNP / STENOSIS     Order Specific Question:   What is the sedation requirement? Answer:   None           Disclaimer: \"This note was dictated with voice recognition software. Though review and correction are routine, we apologize for any errors. \"

## 2022-06-30 ENCOUNTER — TELEPHONE (OUTPATIENT)
Dept: ORTHOPEDIC SURGERY | Age: 32
End: 2022-06-30

## 2022-07-01 ENCOUNTER — TELEPHONE (OUTPATIENT)
Dept: ORTHOPEDIC SURGERY | Age: 32
End: 2022-07-01

## 2022-07-01 ENCOUNTER — HOSPITAL ENCOUNTER (OUTPATIENT)
Dept: PHYSICAL THERAPY | Age: 32
Setting detail: THERAPIES SERIES
Discharge: HOME | End: 2022-07-01
Payer: COMMERCIAL

## 2022-07-01 PROCEDURE — 97112 NEUROMUSCULAR REEDUCATION: CPT

## 2022-07-01 PROCEDURE — 97140 MANUAL THERAPY 1/> REGIONS: CPT

## 2022-07-01 PROCEDURE — 97530 THERAPEUTIC ACTIVITIES: CPT

## 2022-07-01 PROCEDURE — 97161 PT EVAL LOW COMPLEX 20 MIN: CPT

## 2022-07-01 NOTE — FLOWSHEET NOTE
[address]  Phone: [phone]   Fax: [phone]    Physical Therapy Treatment Note/ Progress Report:     Date:  2022    Patient Name:  Wil Osman    :  1990  MRN: [id number]  Restrictions/Precautions:    Medical/Treatment Diagnosis Information:  Diagnosis: B08.947Y (ICD-10-CM) - Shoulder subluxation, right, sequela  Treatment Diagnosis: Decreased R shld AROM, strength, and impaired functional mobility. Decreased cervical AROM and radiating symptoms. Insurance/Certification information:     Physician Information:  Monica Almeida MD    Plan of care signed (Y/N): []  Yes []  No     Date of Patient follow up with Physician:      Progress Report: []  Yes  []  No     Date Range for reporting period:  Beginnin22  Ending:     Progress report due (10 Rx/or 30 days whichever is less): visit #10 or 3/2/22     Recertification due (POC duration/ or 90 days whichever is less): visit #16 or 22 (8 weeks)     Visit # Insurance Allowable Auth required?  Date Range   1 [place] []  Yes  []  No PCY         Latex Allergy:  [x]NO      []YES  Preferred Language for Healthcare:   [x]English       []other:    Functional Scale:           Date assessed:  TO physical FS primary measure score = 59 ; risk adjusted = 52  22     Pain level:  3/10     SUBJECTIVE:  See eval    OBJECTIVE: See eval   Observation:    Test measurements:      RESTRICTIONS/PRECAUTIONS: R Shld Dislocations     Exercises/Interventions:   Therapeutic Exercise (68679) Resistance / level Sets / Seconds  Reps Notes/Cues                                                                                Therapeutic Activities (26578)       Pt Education   15'   See eval                                Neuromuscular Re-ed (77955)       Chin tucks   5\" 10    Prone mid row   1 10    Prone shld ext  1 10    Prone B shld ext w/ hold   5\"  10     ER w/ Scap retract  1 10                  Manual Intervention (10955)       Prone CT jx manip   4'     Supine thoracic manip p  3'     Seated CT jx Manip   3'                              Modalities:     Pt. Education:  -pt educated on diagnosis, prognosis and expectations for rehab  -all pt questions were answered    Home Exercise Program:  HEP instruction: Written HEP instructions provided and reviewed   Access Code: [email or web address]  URL: [email or web address]  Date: 07/01/2022  Prepared by: Mary Phelps     Exercises  Supine Chin Tuck - 1 x daily - 7 x weekly - 10 reps - 5 hold  Shoulder External Rotation and Scapular Retraction with Resistance - 1 x daily - 7 x weekly - 3 sets - 10 reps  Prone Shoulder Row - 1 x daily - 7 x weekly - 3 sets - 10 reps  Prone Shoulder Extension - Single Arm - 1 x daily - 7 x weekly - 3 sets - 10 reps  Prone Scapular Slide with Shoulder Extension - 1 x daily - 7 x weekly - 3 sets - 10 reps - 5 hold      Therapeutic Exercise and NMR EXR  [x] (77654) Provided verbal/tactile cueing for activities related to strengthening, flexibility, endurance, ROM  for improvements in scapular, scapulothoracic and UE control with self care, reaching, carrying, lifting, house/yardwork, driving/computer work.    [] (82723) Provided verbal/tactile cueing for activities related to improving balance, coordination, kinesthetic sense, posture, motor skill, proprioception  to assist with  scapular, scapulothoracic and UE control with self care, reaching, carrying, lifting, house/yardwork, driving/computer work.  [] (81509) Therapist is in constant attendance of 2 or more patients providing skilled therapy interventions, but not providing any significant amount of measurable one-on-one time to either patient, for improvements in cervical, scapular, scapulothoracic and UE control with self care, reaching, carrying, lifting, house/yardwork, driving, computer work.      Therapeutic Activities:    [x] (51794 or 82664) Provided verbal/tactile cueing for activities related to improving balance, coordination, kinesthetic sense, posture, motor skill, proprioception and motor activation to allow for proper function of scapular, scapulothoracic and UE control with self care, carrying, lifting, driving/computer work. Home Exercise Program:    [x] (49036) Reviewed/Progressed HEP activities related to strengthening, flexibility, endurance, ROM of scapular, scapulothoracic and UE control with self care, reaching, carrying, lifting, house/yardwork, driving/computer work  [] (48071) Reviewed/Progressed HEP activities related to improving balance, coordination, kinesthetic sense, posture, motor skill, proprioception of scapular, scapulothoracic and UE control with self care, reaching, carrying, lifting, house/yardwork, driving/computer work      Manual Treatments:  PROM / STM / Oscillations-Mobs:  G-I, II, III, IV (PA's, Inf., Post.)  [x] (80663) Provided manual therapy to mobilize soft tissue/joints of cervical/CT, scapular GHJ and UE for the purpose of modulating pain, promoting relaxation,  increasing ROM, reducing/eliminating soft tissue swelling/inflammation/restriction, improving soft tissue extensibility and allowing for proper ROM for normal function with self care, reaching, carrying, lifting, house/yardwork, driving/computer work      Charges:  Timed Code Treatment Minutes: 44   Total Treatment Minutes: 61       [x] EVAL - LOW (47282)   [] EVAL - MOD (85213)  [] EVAL - HIGH (43279)  [] RE-EVAL (64384)  [] PA(79249) x       [] Ionto  [x] NMR (90732) x 1      [] Vaso  [x] Manual (36330) x1       [] Ultrasound  [x] TA x  1      [] Mech Traction (39871)  [] Aquatic Therapy x     [] ES (un) (10201):   [] Home Management Training x  [] ES(attended) (64140)   [] Dry Needling 1-2 muscles (99550):  [] Dry Needling 3+ muscles (600181  [] Group:      [] Other:                    GOALS:  Patient stated goal: decreased pain   []? Progressing: []? Met: []?  Not Met: []? Adjusted     Therapist goals for Patient:   Short Term Goals: To be achieved in: 2 weeks  1. Independent in HEP and progression per patient tolerance, in order to prevent re-injury. []? Progressing: []? Met: []? Not Met: []? Adjusted  2. Patient will have a decrease in pain to facilitate improvement in movement, function, and ADLs as indicated by Functional Deficits. []? Progressing: []? Met: []? Not Met: []? Adjusted     Long Term Goals: To be achieved in: 8 weeks  1. FOTO score of at least 70 to assist with reaching prior level of function. []? Progressing: []? Met: []? Not Met: []? Adjusted  2. Patient will demonstrate increased AROM to >170 to allow for proper joint functioning as indicated by patients Functional Deficits. []? Progressing: []? Met: []? Not Met: []? Adjusted  3. Patient will demonstrate an increase in Gross RUE Strength to 4+/5 to allow for proper functional mobility as indicated by patients Functional Deficits. []? Progressing: []? Met: []? Not Met: []? Adjusted  4. Patient will return to functional activities including lifting 5# weight overhead without increased symptoms or restriction to be able to carry groceries. []? Progressing: []? Met: []? Not Met: []? Adjusted  5. Pt will report no pain or increased symptoms w/ self care activities such as dressing, hair care to return to PLOF. []? Progressing: []? Met: []? Not Met: []? Adjusted         Overall Progression Towards Functional goals/ Treatment Progress Update:  [] Patient is progressing as expected towards functional goals listed. [] Progression is slowed due to complexities/Impairments listed. [] Progression has been slowed due to co-morbidities.   [x] Plan just implemented, too soon to assess goals progression <30days   [] Goals require adjustment due to lack of progress  [] Patient is not progressing as expected and requires additional follow up with physician  [] Other    Persisting Functional Limitations/Impairments:  []Sitting []Standing   []Transfers  []Sleeping   []Reaching []Lifting   []ADLs []Housework  []Driving []Job related tasks  []Sports/Recreation []Other:    ASSESSMENT:  See eval    Treatment/Activity Tolerance:  [x] Pt able to complete treatment [] Patient limited by fatique  [] Patient limited by pain  [] Patient limited by other medical complications  [] Other:     Prognosis:  [x] Good [] Fair  [] Poor    Patient Requires Follow-up: [x] Yes  [] No    Return to Play:    [x]  N/A      PLAN: See eval. PT 1-2x / week for 6-8 weeks. [] Continue per plan of care [] Alter current plan (see comments)  [x] Plan of care initiated [] Hold pending MD visit [] Discharge    Electronically signed by: Brandyn Verduzco, PT, DPT, OMT-C    Therapist was present, directed the patient's care, made skilled judgement, and was responsible for assessment and treatment of the patient. Tracy Cordova, SPT    Note: If patient does not return for scheduled/ recommended follow up visits, this note will serve as a discharge from care along with most recent update on progress.

## 2022-07-01 NOTE — TELEPHONE ENCOUNTER
General Question     Subject: PATIENT IS WISHING TO BE SEEN ON Tuesday FOR MRI TR   Patient: Margo Yepez  Contact Number: [phone]

## 2022-07-01 NOTE — PLAN OF CARE
[id number]  [facility], [address]  Phone: [phone]   Fax: [phone]                                                     Physical Therapy Certification    Dear Duglas Franco MD  ,    We had the pleasure of evaluating the following patient for physical therapy services at [address] [place] [address]. A summary of our findings can be found in the initial assessment below. This includes our plan of care. If you have any questions or concerns regarding these findings, please do not hesitate to contact me at the office phone number checked above. Thank you for the referral.       Physician Signature:_______________________________Date:__________________  By signing above (or electronic signature), therapists plan is approved by physician      Patient: Felicia Ann   : 1990   MRN: [id number]  Referring Physician: Duglas Franco MD        Evaluation Date: 2022      Medical Diagnosis Information:   A10.616S (ICD-10-CM) - Shoulder subluxation, right, sequela  Treatment Diagnosis: Decreased R shld AROM, strength, and impaired functional mobility. Decreased cervical AROM and radiating symptoms                        Insurance information: [employer] (/10 after deductible)- [place]    Precautions/ Contra-indications: NA   Latex Allergy:  [x]NO      []YES  Preferred Language for Healthcare:   [x]English       []Other:    C-SSRS Triggered by Intake questionnaire (Past 2 wk assessment ):   [x] No, Questionnaire did not trigger screening.   [] Yes, Patient intake triggered C-SSRS Screening     [] Completed, no further action required. [] Completed, PCP notified via Epic    SUBJECTIVE: Patient stated complaint: of R shoulder pain that has been on and off for 15 years. Pt states that she has had several shoulder subluxations within this time frame.  Pt stated that more recently she has experienced numbness and tingling that radiates down her R arm into her hand with neck pain at the C7 area. Pt has 3 young boys all under 11years old and is constantly picking them up and carrying heavy things throughout the house. Pt says that the shoulder is very limiting with completion of ADLs and taking care of her children. She is still nursing and find that it can be very uncomfortable. States that some days she feels pretty good, but other days are very hard to do anything. Pt also mentioned that self-care has become difficult including doing her hair and dressing. Around the home it has been hard to clean and cook without pain. Pt says that she pushes through the pain when she can and will often do yard work and notice that the pain is increased 1-2 days later. Relevant Medical History: Past R RTC tear (no repair)   Functional Outcome: FOTO physical FS primary measure score = 59; Risk adjusted = 52    Pain Scale: 3/10 (9 worst)  Easing factors: Heat, ice, ibuprofen on days that it would help  Provocative factors: Lifting heavy items, cleaning, etc.      Type: [x]Constant   []Intermittent  []Radiating []Localized []other:     Numbness/Tingling: Down the arm and into the hand. 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 OBJECTIVE:   Hand dominance: R     Palpation: No TTP     Functional Mobility/Transfers:  I     Posture: FHP, R scapula depressed, humeral head anterior in glenoid fossa    Bandages/Dressings/Incisions: NA       Dermatomes Normal Abnormal Comments   Top of head (C1) X     Posterior occipital region (C2) X     Side of neck (C3) X     Top of shoulder (C4) X     Lateral deltoid (C5) X     Tip of thumb (C6) X     Distal middle finger (C7) X     Distal fifth finger (C8) X     Medial forearm (T1) X         Reflexes Normal Abnormal Comments   C5-6 Biceps X     C5-6 Brachioradialis       X     C7-8 Triceps       X     Floress      Clonus          Cervical AROM screen: [] WFL  [x] abnormal:     PROM AROM    L R L R   Cervical Flexion    35    Cervical Extension    52    Cervical Rotation 85 85 40 32   Cervical Side-bend 60 70 35 25   Shoulder Flexion   WNL     Shoulder Abduction   WNL  WNL  165   Shoulder External Rotation   WNL  WNL  68   Shoulder Internal Rotation   WNL  T3  T6   Elbow Flexion        Elbow Extension        Pronation        Supination        Wrist Flexion        Wrist Extension        Radial Deviation        Ulnar Deviation            Strength (0-5) Left Right    Shoulder Shrug (C4) 5 5   Shoulder Flex 4+ 4-   Shoulder Abd (C5) 4+ 4-   Shoulder ER 5 4   Shoulder IR 5 4+   Biceps (C6) 4+ 4-   Triceps (C7) 5 4+       Joint mobility:    []Normal    [x]Hypo : CT jx and cervical spine   [x]Hyper: R shld in all direction     Orthopaedic Special Tests Positive  Negative  NT Comments    Shoulder        Neer  X      Rylan-[place]ana  X     Empty Can X      Drop Arm  X     [place]'s       Speeds        Sulcus  X      Apprehension (Anterior / Posterior)  X     Load and Shift                                                      [x] Patient history, allergies, meds reviewed. Medical chart reviewed. See intake form. Review Of Systems (ROS):  [x]Performed Review of systems (Integumentary, CardioPulmonary, Neurological) by intake and observation. Intake form has been scanned into medical record. Patient has been instructed to contact their primary care physician regarding ROS issues if not already being addressed at this time.       Co-morbidities/Complexities (which will affect course of rehabilitation):   []None        []Hx of COVID   Arthritic conditions   []Rheumatoid arthritis (M05.9)  []Osteoarthritis (M19.91)  []Gout   Cardiovascular conditions   []Hypertension (I10)  []Hyperlipidemia (E78.5)  []Angina pectoris (I20)  []Atherosclerosis (I70)  []Pacemaker  []Hx of CABG/stent/  cardiac surgeries   Musculoskeletal conditions   []Disc pathology   []Congenital spine pathologies   []Osteoporosis (M81.8)  []Osteopenia (M85.8)  []Scoliosis       Endocrine conditions   []Hypothyroid (E03.9)  []Hyperthyroid Gastrointestinal conditions   []Constipation (Y61.30)   Metabolic conditions   []Morbid obesity (E66.01)  []Diabetes type 1(E10.65) or 2 (E11.65)   []Neuropathy (G60.9)     Cardio/Pulmonary conditions   []Asthma (J45)  []Coughing   []COPD (J44.9)  []CHF  []A-fib   Psychological Disorders  [x]Anxiety (F41.9)  [x]Depression (F32.9)   []Other:   Developmental Disorders  []Autism (F84.0)  []CP (G80)  []Down Syndrome (Q90.9)  []Developmental delay     Neurological conditions  []Prior Stroke (I69.30)  []Parkinson's (G20)  []Encephalopathy (G93.40)  []MS (G35)  []Post-polio (G14)  []SCI  []TBI  []ALS Other conditions  []Fibromyalgia (M79.7)  []Vertigo  []Syncope  []Kidney Failure  []Cancer      []currently undergoing                treatment  []Pregnancy  []Incontinence   Prior surgeries  []involved limb  []previous spinal surgery  [] section birth  []hysterectomy  []bowel / bladder surgery  []other relevant surgeries   []Other:              Barriers to/and or personal factors that will affect rehab potential:              [x]Age  []Sex    [x]Smoker              [x]Motivation/Lack of Motivation                        []Co-Morbidities              []Cognitive Function, education/learning barriers              [x]Environmental, home barriers              []profession/work barriers  []past PT/medical experience  []other:  Justification: pt will have good rehab potential due to being motivated and age, pt may be limited by her home environment and smoking status. Falls Risk Assessment (30 days):   [x] Falls Risk assessed and no intervention required.   [] Falls Risk assessed and Patient requires intervention due to being higher risk   TUG score (>12s at risk):     [] Falls education provided, including       ASSESSMENT: Pt presented to therapy w/ R shld pain and numbness/tingling throughout RUE. Pt has decreased AROM of R shoulder and cervical motion to the R. Pt has full PROM of shoulder and cervical PROM w/in normal limits, however, decreased SB to the L due to soft tissue constraints. Pt lacks upward rotation of scapula in flex/abd movements. Pt is hypermobile in R shoulder > L. Assessment of cervical spine found hypomobility of cervical spine into CT junction. Pt has increased cervical lordosis and exhibits decreased postural stability of neck / periscapular musculature. Hypomobility of cervical spine was addressed with manual techniques including seated, prone, and supine thoracic manip. Cavitation w/ supine maneuver. Exercises were given to re-educate periscapular and neck flexor musculature. Pt was educated on hypermobility, specifically ED, diagnosis, and prognosis as well as HEP. Skilled PT services recommended to increase ROM, strength, and endurance.      Functional Impairments   [x]Noted spinal or UE joint hypomobility   [x]Noted spinal or UE joint hypermobility   [x]Decreased UE functional ROM   [x]Decreased UE functional strength   []Abnormal reflexes/sensation/myotomal/dermatomal deficits   [x]Decreased RC/scapular/core strength and neuromuscular control   []other:      Functional Activity Limitations (from functional questionnaire and intake)   []Reduced ability to tolerate prolonged functional positions   []Reduced ability or difficulty with changes of positions or transfers between positions   [x]Reduced ability to maintain good posture and demonstrate good body mechanics with sitting, bending, and lifting   [] Reduced ability or tolerance with driving and/or computer work   [x]Reduced ability to sleep   [x]Reduced ability to perform lifting, reaching, carrying tasks   [x]Reduced ability to tolerate impact through UE   [x]Reduced ability to reach behind back   []Reduced ability to  or hold objects   [x]Reduced ability to throw or toss an object   []other:    Participation Restrictions   [x]Reduced participation in self care activities   [x]Reduced participation in home management activities   []Reduced participation in work activities   [x]Reduced participation in social activities. [x]Reduced participation in sport/recreation activities. Classification:   []Signs/symptoms consistent with post-surgical status including decreased ROM, strength and function.   [x]Signs/symptoms consistent with joint sprain/strain   []Signs/symptoms consistent with shoulder impingement   []Signs/symptoms consistent with shoulder/elbow/wrist tendinopathy   [x]Signs/symptoms consistent with Rotator cuff tear   []Signs/symptoms consistent with labral tear   []Signs/symptoms consistent with postural dysfunction    []Signs/symptoms consistent with Glenohumeral IR Deficit - <45 degrees   []Signs/symptoms consistent with facet dysfunction of cervical/thoracic spine    []Signs/symptoms consistent with pathology which may benefit from Dry needling     []other:     Prognosis/Rehab Potential:      []Excellent   [x]Good    []Fair   []Poor    Tolerance of evaluation/treatment:    []Excellent   [x]Good    []Fair   []Poor    Physical Therapy Evaluation Complexity Justification  [x] A history of present problem with:  [] no personal factors and/or comorbidities that impact the plan of care;  [x]1-2 personal factors and/or comorbidities that impact the plan of care  []3 personal factors and/or comorbidities that impact the plan of care  [x] An examination of body systems using standardized tests and measures addressing any of the following: body structures and functions (impairments), activity limitations, and/or participation restrictions;:  [] a total of 1-2 or more elements   [] a total of 3 or more elements   [x] a total of 4 or more elements   [x] A clinical presentation with:  [x] stable and/or uncomplicated characteristics   [] evolving clinical presentation with changing characteristics  [] unstable and unpredictable characteristics;   [x] Clinical decision making of [x] low, [] moderate, [] high complexity using standardized patient assessment instrument and/or measurable assessment of functional outcome. [x] EVAL (LOW) 29525 (typically 15 minutes face-to-face)  [] EVAL (MOD) 80795 (typically 30 minutes face-to-face)  [] EVAL (HIGH) 40668 (typically 45 minutes face-to-face)  [] RE-EVAL     PLAN:  Frequency/Duration:  1-2 days per week for 6-8 Weeks:  INTERVENTIONS:  [x] Therapeutic exercise including: strength training, ROM, for Upper extremity and core   [x]  NMR activation and proprioception for UE, scap and Core   [x] Manual therapy as indicated for shoulder, scapula and spine to include: Dry Needling/IASTM, STM, PROM, Gr I-IV mobilizations, manipulation. [x] Modalities as needed that may include: thermal agents, E-stim, Biofeedback, US, iontophoresis as indicated  [x] Patient education on joint protection, postural re-education, activity modification, progression of HEP. HEP instruction: Written HEP instructions provided and reviewed   Access Code: [email or web address]  URL: [email or web address]  Date: 07/01/2022  Prepared by: Jenna Lowery    Exercises  Supine Chin Tuck - 1 x daily - 7 x weekly - 10 reps - 5 hold  Shoulder External Rotation and Scapular Retraction with Resistance - 1 x daily - 7 x weekly - 3 sets - 10 reps  Prone Shoulder Row - 1 x daily - 7 x weekly - 3 sets - 10 reps  Prone Shoulder Extension - Single Arm - 1 x daily - 7 x weekly - 3 sets - 10 reps  Prone Scapular Slide with Shoulder Extension - 1 x daily - 7 x weekly - 3 sets - 10 reps - 5 hold      GOALS:  Patient stated goal: decreased pain   [] Progressing: [] Met: [] Not Met: [] Adjusted    Therapist goals for Patient:   Short Term Goals: To be achieved in: 2 weeks  1.  Independent in HEP and progression per patient tolerance, in order to prevent re-injury. [] Progressing: [] Met: [] Not Met: [] Adjusted  2. Patient will have a decrease in pain to facilitate improvement in movement, function, and ADLs as indicated by Functional Deficits. [] Progressing: [] Met: [] Not Met: [] Adjusted    Long Term Goals: To be achieved in: 8 weeks  1. FOTO score of at least 70 to assist with reaching prior level of function. [] Progressing: [] Met: [] Not Met: [] Adjusted  2. Patient will demonstrate increased AROM to >170 to allow for proper joint functioning as indicated by patients Functional Deficits. [] Progressing: [] Met: [] Not Met: [] Adjusted  3. Patient will demonstrate an increase in Gross RUE Strength to 4+/5 to allow for proper functional mobility as indicated by patients Functional Deficits. [] Progressing: [] Met: [] Not Met: [] Adjusted  4. Patient will return to functional activities including lifting 5# weight overhead without increased symptoms or restriction to be able to carry groceries. [] Progressing: [] Met: [] Not Met: [] Adjusted  5. Pt will report no pain or increased symptoms w/ self care activities such as dressing, hair care to return to PLOF. [] Progressing: [] Met: [] Not Met: [] Adjusted     Electronically signed by:  Kain Christensen, PT DPT, OMT-C     Therapist was present, directed the patient's care, made skilled judgement, and was responsible for assessment and treatment of the patient.         Stefania Echols, SPT

## 2022-07-05 ENCOUNTER — HOSPITAL ENCOUNTER (OUTPATIENT)
Dept: PHYSICAL THERAPY | Age: 32
Setting detail: THERAPIES SERIES
Discharge: HOME | End: 2022-07-05
Payer: COMMERCIAL

## 2022-07-05 PROCEDURE — 97140 MANUAL THERAPY 1/> REGIONS: CPT

## 2022-07-05 PROCEDURE — 97110 THERAPEUTIC EXERCISES: CPT

## 2022-07-05 PROCEDURE — 97112 NEUROMUSCULAR REEDUCATION: CPT

## 2022-07-05 NOTE — FLOWSHEET NOTE
[address]  Phone: [phone]   Fax: [phone]    Physical Therapy Treatment Note/ Progress Report:     Date:  2022    Patient Name:  Fran Ware    :  1990  MRN: [id number]  Restrictions/Precautions:    Medical/Treatment Diagnosis Information:  Diagnosis: P43.071O (ICD-10-CM) - Shoulder subluxation, right, sequela  Treatment Diagnosis: Decreased R shld AROM, strength, and impaired functional mobility. Decreased cervical AROM and radiating symptoms. Insurance/Certification information:   [place] (90[place]10 [place])- [place]; Dry needling not covered  Physician Information:  Carla Hand MD    Plan of care signed (Y/N): []  Yes []  No     Date of Patient follow up with Physician: spine - 22     Progress Report: []  Yes  [x]  No     Date Range for reporting period:  Beginnin22  Ending:     Progress report due (10 Rx/or 30 days whichever is less): visit #10 or 92     Recertification due (POC duration/ or 90 days whichever is less): visit #16 or 22 (8 weeks)     Visit # Insurance Allowable Auth required? Date Range   2 [place] []  Yes  []  No PCY         Latex Allergy:  [x]NO      []YES  Preferred Language for Healthcare:   [x]English       []other:    Functional Scale:           Date assessed:  FOTO physical FS primary measure score = 59 ; risk adjusted = 52  22     Pain level:  4/10     SUBJECTIVE: Pt stated that she felt pretty sore after last session with a lot of pain in neck and shoulders yesterday. She states that there is a lot more stiffness today in shoulders and neck feeling like there is a vane there.      OBJECTIVE: See eval   Observation:    Test measurements:      RESTRICTIONS/PRECAUTIONS: R Shld Dislocations     Exercises/Interventions:   Therapeutic Exercise (19049) Resistance / level Sets / Seconds  Reps Notes/Cues   UBE   2'     Doorway Pec stretch   20' 2     Standing ER w/ Band Blue  2 10 Standing shld FLEX w/ chin tuck  Dowel 1 15    3-way tap on wall  [place]  1 10 ea                                                     Therapeutic Activities (07853)       Pt Education     See eval                                Neuromuscular Re-ed (29727)       Chin tucks   3\" 20    Prone mid row  #2 2 15    Prone shld ext #2 2 15     Prone B shld ext w/ hold   5\"  10     ER w/ Scap retract [place]  2 10    Shoulder taps on plinth   2 20           Manual Intervention (52057)       Prone CT jx manip       Supine thoracic manip p      Seated CT jx Manip       Cervical Soft tissue   3'     Sub-occipital release  4'     Trigger point release   4'  B upper trap                             Modalities:     Pt. Education:  -pt educated on diagnosis, prognosis and expectations for rehab  -all pt questions were answered    Home Exercise Program:  HEP instruction: Written HEP instructions provided and reviewed   Access Code: [email or web address]  URL: [email or web address]  Date: 07/01/2022  Prepared by: Chantal Iyer     Exercises  Supine Chin Tuck - 1 x daily - 7 x weekly - 10 reps - 5 hold  Shoulder External Rotation and Scapular Retraction with Resistance - 1 x daily - 7 x weekly - 3 sets - 10 reps  Prone Shoulder Row - 1 x daily - 7 x weekly - 3 sets - 10 reps  Prone Shoulder Extension - Single Arm - 1 x daily - 7 x weekly - 3 sets - 10 reps  Prone Scapular Slide with Shoulder Extension - 1 x daily - 7 x weekly - 3 sets - 10 reps - 5 hold      Therapeutic Exercise and NMR EXR  [x] (31753) Provided verbal/tactile cueing for activities related to strengthening, flexibility, endurance, ROM  for improvements in scapular, scapulothoracic and UE control with self care, reaching, carrying, lifting, house/yardwork, driving/computer work.    [] (05723) Provided verbal/tactile cueing for activities related to improving balance, coordination, kinesthetic sense, posture, motor skill, proprioception  to assist with  scapular, scapulothoracic and UE control with self care, reaching, carrying, lifting, house/yardwork, driving/computer work.  [] (37796) Therapist is in constant attendance of 2 or more patients providing skilled therapy interventions, but not providing any significant amount of measurable one-on-one time to either patient, for improvements in cervical, scapular, scapulothoracic and UE control with self care, reaching, carrying, lifting, house/yardwork, driving, computer work. Therapeutic Activities:    [x] (89034 or 21471) Provided verbal/tactile cueing for activities related to improving balance, coordination, kinesthetic sense, posture, motor skill, proprioception and motor activation to allow for proper function of scapular, scapulothoracic and UE control with self care, carrying, lifting, driving/computer work.      Home Exercise Program:    [x] (25960) Reviewed/Progressed HEP activities related to strengthening, flexibility, endurance, ROM of scapular, scapulothoracic and UE control with self care, reaching, carrying, lifting, house/yardwork, driving/computer work  [] (11951) Reviewed/Progressed HEP activities related to improving balance, coordination, kinesthetic sense, posture, motor skill, proprioception of scapular, scapulothoracic and UE control with self care, reaching, carrying, lifting, house/yardwork, driving/computer work      Manual Treatments:  PROM / STM / Oscillations-Mobs:  G-I, II, III, IV (PA's, Inf., Post.)  [x] (95482) Provided manual therapy to mobilize soft tissue/joints of cervical/CT, scapular GHJ and UE for the purpose of modulating pain, promoting relaxation,  increasing ROM, reducing/eliminating soft tissue swelling/inflammation/restriction, improving soft tissue extensibility and allowing for proper ROM for normal function with self care, reaching, carrying, lifting, house/yardwork, driving/computer work      Charges:  Timed Code Treatment Minutes: 46   Total Treatment Minutes: 46       [] EVAL - LOW (72359)   [] EVAL - MOD (89062)  [] EVAL - HIGH (01011)  [] RE-EVAL (21607)  [x] DM(75577) x 1      [] Ionto  [x] NMR (07520) x 1      [] Vaso  [x] Manual (27795) x1       [] Ultrasound  [] TA x      [] Mech Traction (09732)  [] Aquatic Therapy x     [] ES (un) (58818):   [] Home Management Training x  [] ES(attended) (61784)   [] Dry Needling 1-2 muscles (01547):  [] Dry Needling 3+ muscles (715554  [] Group:      [] Other:                    GOALS:  Patient stated goal: decreased pain   []? Progressing: []? Met: []? Not Met: []? Adjusted     Therapist goals for Patient:   Short Term Goals: To be achieved in: 2 weeks  1. Independent in HEP and progression per patient tolerance, in order to prevent re-injury. []? Progressing: []? Met: []? Not Met: []? Adjusted  2. Patient will have a decrease in pain to facilitate improvement in movement, function, and ADLs as indicated by Functional Deficits. []? Progressing: []? Met: []? Not Met: []? Adjusted     Long Term Goals: To be achieved in: 8 weeks  1. FOTO score of at least 70 to assist with reaching prior level of function. []? Progressing: []? Met: []? Not Met: []? Adjusted  2. Patient will demonstrate increased AROM to >170 to allow for proper joint functioning as indicated by patients Functional Deficits. []? Progressing: []? Met: []? Not Met: []? Adjusted  3. Patient will demonstrate an increase in Gross RUE Strength to 4+/5 to allow for proper functional mobility as indicated by patients Functional Deficits. []? Progressing: []? Met: []? Not Met: []? Adjusted  4. Patient will return to functional activities including lifting 5# weight overhead without increased symptoms or restriction to be able to carry groceries. []? Progressing: []? Met: []? Not Met: []? Adjusted  5. Pt will report no pain or increased symptoms w/ self care activities such as dressing, hair care to return to PLOF. []? Progressing: []? Met: []? Not Met: []?  Adjusted Overall Progression Towards Functional goals/ Treatment Progress Update:  [] Patient is progressing as expected towards functional goals listed. [] Progression is slowed due to complexities/Impairments listed. [] Progression has been slowed due to co-morbidities. [x] Plan just implemented, too soon to assess goals progression <30days   [] Goals require adjustment due to lack of progress  [] Patient is not progressing as expected and requires additional follow up with physician  [] Other    Persisting Functional Limitations/Impairments:  []Sitting []Standing   []Transfers  []Sleeping   []Reaching []Lifting   []ADLs []Housework  []Driving []Job related tasks  []Sports/Recreation []Other:    ASSESSMENT:  Pt tolerated tx session well today w/ no increase of pain. Manual soft tissue techniques used to decrease tension in cervical extensors and B upper trap. Pt experiences B popping in shoulders when lowering dowel from flexed position. W/ manual humeral posterior translation and scapular upward translation, popping ceased. Pt was educated that this could cause inflammation in the shoulders causing a pain response and that it is likely due to decreased strength and endurance of RTC and periscapular musculature. Exercises added in bold above to increase R shoulder strength and endurance. Continued PT services to increase AROM, strength, and endurance to return to PLOF. Treatment/Activity Tolerance:  [x] Pt able to complete treatment [] Patient limited by fatique  [] Patient limited by pain  [] Patient limited by other medical complications  [] Other:     Prognosis:  [x] Good [] Fair  [] Poor    Patient Requires Follow-up: [x] Yes  [] No    Return to Play:    [x]  N/A      PLAN: See eval. PT 1-2x / week for 6-8 weeks.   [] Continue per plan of care [] Alter current plan (see comments)  [x] Plan of care initiated [] Hold pending MD visit [] Discharge    Electronically signed by: Rylan Sandoval, PT, DPT, OMT-C    Therapist was present, directed the patient's care, made skilled judgement, and was responsible for assessment and treatment of the patient. SG Mendieta    Note: If patient does not return for scheduled/ recommended follow up visits, this note will serve as a discharge from care along with most recent update on progress.

## 2022-07-05 NOTE — TELEPHONE ENCOUNTER
LVM for pt to let them know we can see her today. Left call center number in voicemail to let pt call to schedule.

## 2022-07-05 NOTE — TELEPHONE ENCOUNTER
Robbie Baker Schedule her and appointment for 7/11/22 I was just letting the office know . Please Advise.

## 2022-07-07 ENCOUNTER — HOSPITAL ENCOUNTER (OUTPATIENT)
Dept: PHYSICAL THERAPY | Age: 32
Setting detail: THERAPIES SERIES
Discharge: HOME | End: 2022-07-07
Payer: COMMERCIAL

## 2022-07-07 PROCEDURE — 97110 THERAPEUTIC EXERCISES: CPT

## 2022-07-07 PROCEDURE — 97140 MANUAL THERAPY 1/> REGIONS: CPT

## 2022-07-07 PROCEDURE — 97112 NEUROMUSCULAR REEDUCATION: CPT

## 2022-07-07 NOTE — FLOWSHEET NOTE
[address]  Phone: [phone]   Fax: [phone]    Physical Therapy Treatment Note/ Progress Report:     Date:  2022    Patient Name:  Ulises Mora    :  1990  MRN: [id number]  Restrictions/Precautions:    Medical/Treatment Diagnosis Information:  Diagnosis: O69.896E (ICD-10-CM) - Shoulder subluxation, right, sequela  Treatment Diagnosis: Decreased R shld AROM, strength, and impaired functional mobility. Decreased cervical AROM and radiating symptoms. Insurance/Certification information:   [place] (90/10 [place]  Physician Information:  Mana Amaya MD    Plan of care signed (Y/N): [x]  Yes []  No     Date of Patient follow up with Physician: spine - 22     Progress Report: []  Yes  [x]  No     Date Range for reporting period:  Beginnin22  Ending:     Progress report due (10 Rx/or 30 days whichever is less): visit #10 or 40     Recertification due (POC duration/ or 90 days whichever is less): visit #16 or 22 (8 weeks)     Visit # Insurance Allowable Auth required? Date Range   3 [place] []  Yes  []  No [place]Y         Latex Allergy:  [x]NO      []YES  Preferred Language for Healthcare:   [x]English       []other:    Functional Scale:           Date assessed:  FOTO physical FS primary measure score = 59 ; risk adjusted = 52  22     Pain level:  1/10     SUBJECTIVE: Pt stated that she felt sore and weak after the last session, but it did not increase any pain.      OBJECTIVE: See eval   Observation:    Test measurements:      RESTRICTIONS/PRECAUTIONS: R Shld Dislocations     Exercises/Interventions:   Therapeutic Exercise (48146) Resistance / level Sets / Seconds  Reps Notes/Cues   UBE   2'     Doorway Pec stretch   20' 2     Standing ER w/ Band [place]  2 10    Standing shld FLEX w/ chin tuck  Dowel 1 15    3-way tap on wall  [place]  1 10 ea    LPD  [place]  1 15     Anti rotation abduction - ant  [place]  1  10ea  Cueing to stay in abduction                                       Therapeutic Activities (37133)       Pt Education     See eval                                Neuromuscular Re-ed (11246)       Chin tucks   3\" 20    Prone mid row  #2 2 15    Prone shld ext #2 2 15     Prone B shld ext w/ hold   5\"  10     ER w/ Scap retract [place]  2 10    Shoulder taps on plinth             Manual Intervention (58697)       Prone CT jx manip       Supine thoracic manip   4' Cavitation    Seated CT jx Manip       Cervical Soft tissue   3'     Sub-occipital release  4'     Trigger point release   5'  B upper trap                             Modalities:     Pt. Education:  -pt educated on diagnosis, prognosis and expectations for rehab  -all pt questions were answered    Home Exercise Program:  HEP instruction: Written HEP instructions provided and reviewed   Access Code: [email or web address]  URL: [email or web address]  Date: 07/01/2022  Prepared by: Jenna Lowery     Exercises  Supine Chin Tuck - 1 x daily - 7 x weekly - 10 reps - 5 hold  Shoulder External Rotation and Scapular Retraction with Resistance - 1 x daily - 7 x weekly - 3 sets - 10 reps  Prone Shoulder Row - 1 x daily - 7 x weekly - 3 sets - 10 reps  Prone Shoulder Extension - Single Arm - 1 x daily - 7 x weekly - 3 sets - 10 reps  Prone Scapular Slide with Shoulder Extension - 1 x daily - 7 x weekly - 3 sets - 10 reps - 5 hold      Therapeutic Exercise and NMR EXR  [x] (82174) Provided verbal/tactile cueing for activities related to strengthening, flexibility, endurance, ROM  for improvements in scapular, scapulothoracic and UE control with self care, reaching, carrying, lifting, house/yardwork, driving/computer work.    [] (41213) Provided verbal/tactile cueing for activities related to improving balance, coordination, kinesthetic sense, posture, motor skill, proprioception  to assist with  scapular, scapulothoracic and UE control with self care, reaching, carrying, lifting, house/yardwork, driving/computer work.  [] (35060) Therapist is in constant attendance of 2 or more patients providing skilled therapy interventions, but not providing any significant amount of measurable one-on-one time to either patient, for improvements in cervical, scapular, scapulothoracic and UE control with self care, reaching, carrying, lifting, house/yardwork, driving, computer work. Therapeutic Activities:    [x] (41818 or 01193) Provided verbal/tactile cueing for activities related to improving balance, coordination, kinesthetic sense, posture, motor skill, proprioception and motor activation to allow for proper function of scapular, scapulothoracic and UE control with self care, carrying, lifting, driving/computer work.      Home Exercise Program:    [x] (45169) Reviewed/Progressed HEP activities related to strengthening, flexibility, endurance, ROM of scapular, scapulothoracic and UE control with self care, reaching, carrying, lifting, house/yardwork, driving/computer work  [] (26464) Reviewed/Progressed HEP activities related to improving balance, coordination, kinesthetic sense, posture, motor skill, proprioception of scapular, scapulothoracic and UE control with self care, reaching, carrying, lifting, house/yardwork, driving/computer work      Manual Treatments:  PROM / STM / Oscillations-Mobs:  G-I, II, III, IV (PA's, Inf., Post.)  [x] (48258) Provided manual therapy to mobilize soft tissue/joints of cervical/CT, scapular GHJ and UE for the purpose of modulating pain, promoting relaxation,  increasing ROM, reducing/eliminating soft tissue swelling/inflammation/restriction, improving soft tissue extensibility and allowing for proper ROM for normal function with self care, reaching, carrying, lifting, house/yardwork, driving/computer work      Charges:  Timed Code Treatment Minutes: 45   Total Treatment Minutes: 45       [] EVAL - LOW (96262)   [] EVAL - MOD (72519)  [] EVAL - HIGH (46583)  [] RE-EVAL (39646)  [x] GL(31982) x 1      [] Ionto  [x] NMR (62048) x 1      [] Vaso  [x] Manual (16750) x1       [] Ultrasound  [] TA x      [] Mech Traction (71884)  [] Aquatic Therapy x     [] ES (un) (71832):   [] Home Management Training x  [] ES(attended) (68189)   [] Dry Needling 1-2 muscles (92212):  [] Dry Needling 3+ muscles (981360  [] Group:      [] Other:                    GOALS:  Patient stated goal: decreased pain   []? Progressing: []? Met: []? Not Met: []? Adjusted     Therapist goals for Patient:   Short Term Goals: To be achieved in: 2 weeks  1. Independent in HEP and progression per patient tolerance, in order to prevent re-injury. []? Progressing: []? Met: []? Not Met: []? Adjusted  2. Patient will have a decrease in pain to facilitate improvement in movement, function, and ADLs as indicated by Functional Deficits. []? Progressing: []? Met: []? Not Met: []? Adjusted     Long Term Goals: To be achieved in: 8 weeks  1. FOTO score of at least 70 to assist with reaching prior level of function. []? Progressing: []? Met: []? Not Met: []? Adjusted  2. Patient will demonstrate increased AROM to >170 to allow for proper joint functioning as indicated by patients Functional Deficits. []? Progressing: []? Met: []? Not Met: []? Adjusted  3. Patient will demonstrate an increase in Gross RUE Strength to 4+/5 to allow for proper functional mobility as indicated by patients Functional Deficits. []? Progressing: []? Met: []? Not Met: []? Adjusted  4. Patient will return to functional activities including lifting 5# weight overhead without increased symptoms or restriction to be able to carry groceries. []? Progressing: []? Met: []? Not Met: []? Adjusted  5. Pt will report no pain or increased symptoms w/ self care activities such as dressing, hair care to return to PLOF. []? Progressing: []? Met: []? Not Met: []?  Adjusted         Overall Progression Towards Functional goals/ Treatment Progress Update:  [] Patient is progressing as expected towards functional goals listed. [] Progression is slowed due to complexities/Impairments listed. [] Progression has been slowed due to co-morbidities. [x] Plan just implemented, too soon to assess goals progression <30days   [] Goals require adjustment due to lack of progress  [] Patient is not progressing as expected and requires additional follow up with physician  [] Other    Persisting Functional Limitations/Impairments:  []Sitting []Standing   []Transfers  []Sleeping   []Reaching []Lifting   []ADLs []Housework  []Driving []Job related tasks  []Sports/Recreation []Other:    ASSESSMENT:  Pt tolerated tx session well today w/ no increase of pain. Manual soft tissue techniques used to decrease tension in cervical extensors and B upper trap. Noted hypomobility in thoracic spine, cavitation x2 w/ supine thoracic manip. Pt experiences B popping in shoulders when lowering dowel from flexed position and post pull anti-rotation abduction. Pt is easily fatigued w/ high repetitions. PT required cueing to maintain movement in abduction plane. Pt was educated on self-trigger point release w/ ball or thera-cane. Pt Exercises added in bold above to increase R shoulder strength and endurance. Continued PT services to increase AROM, strength, and endurance to return to PLOF. Treatment/Activity Tolerance:  [x] Pt able to complete treatment [] Patient limited by fatique  [] Patient limited by pain  [] Patient limited by other medical complications  [] Other:     Prognosis:  [x] Good [] Fair  [] Poor    Patient Requires Follow-up: [x] Yes  [] No    Return to Play:    [x]  N/A      PLAN: See eval. PT 1-2x / week for 6-8 weeks.   [] Continue per plan of care [] Alter current plan (see comments)  [x] Plan of care initiated [] Hold pending MD visit [] Discharge    Electronically signed by: Yolanda Cerrato, PT, DPT, OMT-C    Therapist was present, directed the patient's care, made skilled judgement, and was responsible for assessment and treatment of the patient. Tracy Cordova, SPT    Note: If patient does not return for scheduled/ recommended follow up visits, this note will serve as a discharge from care along with most recent update on progress.

## 2022-07-11 ENCOUNTER — OFFICE VISIT (OUTPATIENT)
Dept: ORTHOPEDIC SURGERY | Age: 32
End: 2022-07-11
Payer: COMMERCIAL

## 2022-07-11 VITALS — BODY MASS INDEX: 21.05 KG/M2 | WEIGHT: 130.95 LBS | HEIGHT: 66 IN

## 2022-07-11 DIAGNOSIS — M50.20 CERVICAL DISC DISPLACEMENT: ICD-10-CM

## 2022-07-11 DIAGNOSIS — M48.02 FORAMINAL STENOSIS OF CERVICAL REGION: ICD-10-CM

## 2022-07-11 DIAGNOSIS — M54.2 NECK PAIN: ICD-10-CM

## 2022-07-11 DIAGNOSIS — M54.12 RADICULITIS OF RIGHT CERVICAL REGION: ICD-10-CM

## 2022-07-11 DIAGNOSIS — M35.7 BENIGN HYPERMOBILITY SYNDROME: ICD-10-CM

## 2022-07-11 PROCEDURE — 99214 OFFICE O/P EST MOD 30 MIN: CPT | Performed by: INTERNAL MEDICINE

## 2022-07-12 RX ORDER — GABAPENTIN 300 MG/1
300 CAPSULE ORAL NIGHTLY
Qty: 30 CAPSULE | Refills: 1 | Status: SHIPPED | OUTPATIENT
Start: 2022-07-12 | End: 2022-09-10

## 2022-07-12 NOTE — PROGRESS NOTES
Chief Complaint:   Chief Complaint   Patient presents with    Neck Pain     Cervical, TR MRI,feels 30% better overall. Doesn't have the numbness down both arms anymore. Feels PT and stretches/exercises have been helpful. Still having burning, achy, stabbing pain in neck and mid back          History of Present Illness:       Patient is a 32 y.o. female returns follow up for the above complaint. The patient was last seen approximately 1 monthsago. The symptoms show no change since the last visit. The patient has had further testing for the problem. MRI completed in the interim. good response to the trial of Steroids-Medrol    Neck:Right arm pain 60: 40. Pain neck and arm is aching, burning or pins-and-needles in quality. Pain levels:6.     The patient claims new onset or progressive weakness of the upper extremities. The patient denies new onset gain disturbance. She continues on medical pain management as per previous  inclusive or NSAID-meloxicam and Flexeril     Past Medical History:        Past Medical History:   Diagnosis Date    Blood type O+     Recurrent depression ([facility] [address]         Present Medications:         Current Outpatient Medications   Medication Sig Dispense Refill    gabapentin (NEURONTIN) 300 MG capsule Take 1 capsule by mouth nightly for 60 days. Intended supply: 30 days 30 capsule 1    meloxicam (MOBIC) 15 MG tablet Take 1 tablet by mouth daily as needed for Pain After completing Medrol 30 tablet 2    cyclobenzaprine (FLEXERIL) 10 MG tablet Take 1 tablet by mouth nightly as needed for Muscle spasms 30 tablet 1    escitalopram (LEXAPRO) 20 MG tablet Take 20 mg by mouth daily       No current facility-administered medications for this visit. Allergies: Allergies   Allergen Reactions    Hydrocodone-Acetaminophen            Review of Systems:    Pertinent items are noted in HPI    ab. Vital Signs: There were no vitals filed for this visit.      General Exam:     Constitutional: Patient is adequately groomed with no evidence of malnutrition    Physical Exam: Cervical neck      Primary Exam:    Inspection: No deformity atrophy appreciable curvature      Palpation: No focal trigger point tenderness      Range of Motion: Near full range      Strength: Normal upper extremity      Special Tests: Spurling sign purposely not assessed      Skin: There are no rashes, ulcerations or lesions. Gait: Nonantalgic     Neurovascular - non focal and intact       Additional Comments:        Additional Examinations:                  Office Imaging Results/Procedures PerformedToday:           Office Procedures:     Orders Placed This Encounter   Procedures   [facility]     Referral Priority:   Routine     Referral Type:   Eval and Treat     Referral Reason:   Specialty Services Required     Requested Specialty:   Physical Therapist     Number of Visits Requested:   1           Other Outside Imaging and Testing Personally Reviewed:    [address]   null   Susan Dye, [address]           Patient Name: Angel Sanchez   Case ID: [id number]   Patient : 1990   Referring Physician: Jeimy Aguero MD   Exam Date: 2022   Exam Description: MR Cervical Spine w/o Contrast            HISTORY:  Diagnosis of stenosis and herniated nucleus pulposus.       TECHNICAL FACTORS:  Long- and short-axis fat- and water-weighted images were performed.       COMPARISON:  None.       FINDINGS:  No acute fracture identified.       Prespinal, paraspinal, and retrospinal soft tissues are normal.       Marrow signal appears normal.       Visualized posterior fossa and position of the cerebellar tonsils appear normal. Visualized    lucie, medulla, and spinal cord are normal in signal and contour.  No evidence of an intradural    or extradural mass.       C2-C3: No focal disc herniation or foraminal narrowing.       C3-C4: No focal disc herniation or foraminal narrowing.       C4-C5: No focal disc herniation or foraminal narrowing.       C5-C6: Spondylosis.  Right foraminal disc-osteophyte complex/Luschka joint hypertrophy    contributes to mild foraminal narrowing and impingement upon the exiting right C6 nerve root    sleeve.       C6-C7: No focal disc herniation or foraminal narrowing.       C7-T1: No focal disc herniation or foraminal narrowing.       Suspect small Tornwaldt cyst as an incidental finding.       Cervicothoracic junction is intact.       Anterior prevertebral soft tissues are normal.       Visualized retropharyngeal spaces are normal.       Visualized vertebral arteries are patent with expected flow-void signal.       Thyroid gland is normal.       Visualized portions of the lung apices are clear however, dedicated CT thorax required for    complete diagnostic evaluation.       Visualized esophagus is normal.       CONCLUSION:   1. Dominant finding, at C5-C6, right foraminal disc-osteophyte complex/Luschka joint    hypertrophy resulting in mild foraminal narrowing and impingement upon the exiting right C6    nerve root sleeve which likely contributes to the patient's right-sided clinical syndrome. 2. No soft disc herniation.       Thank you for the opportunity to provide your interpretation.               Darian Valdez. Randall Cano MD       A: EDITA/SAMMY/donovan 06/30/2022 12:15 PM   T: TV 06/30/2022 9:32 AM                   Assessment   Impression: . Encounter Diagnoses   Name Primary?     Cervical disc displacement     Foraminal stenosis of cervical region     Radiculitis of right cervical region     Benign hypermobility syndrome     Neck pain         Right foraminal C6 nerve root impingement      Plan:     Commence formal course of PT cervical stabilization program and trial of cervical traction  Medical pain management as per previous addition of gabapentin 300 mg nightly  Cervical spine intervention injection-C THERON as

## 2022-07-13 ENCOUNTER — HOSPITAL ENCOUNTER (OUTPATIENT)
Dept: PHYSICAL THERAPY | Age: 32
Setting detail: THERAPIES SERIES
Discharge: HOME | End: 2022-07-13
Payer: COMMERCIAL

## 2022-07-13 PROCEDURE — 97112 NEUROMUSCULAR REEDUCATION: CPT

## 2022-07-13 PROCEDURE — 97110 THERAPEUTIC EXERCISES: CPT

## 2022-07-13 NOTE — FLOWSHEET NOTE
[address]  Phone: [phone]   Fax: [phone]    Physical Therapy Treatment Note/ Progress Report:     Date:  2022    Patient Name:  Luis Strauss    :  1990  MRN: [id number]  Restrictions/Precautions:    Medical/Treatment Diagnosis Information:  Diagnosis: H14.796X (ICD-10-CM) - Shoulder subluxation, right, sequela  Treatment Diagnosis: Decreased R shld AROM, strength, and impaired functional mobility. Decreased cervical AROM and radiating symptoms. Insurance/Certification information:   [place] (90/10 after deductible)- [place]; Dry needling not covered  Physician Information:  Ebony Yadav MD    Plan of care signed (Y/N): [x]  Yes []  No     Date of Patient follow up with Physician: spine - 22     Progress Report: []  Yes  [x]  No     Date Range for reporting period:  Beginnin22  Ending: TBD     Progress report due (10 Rx/or 30 days whichever is less): visit #10 or 40     Recertification due (POC duration/ or 90 days whichever is less): visit #16 or 22 (8 weeks)     Visit # Insurance Allowable Auth required? Date Range   4 [place] []  Yes  []  [place]     Latex Allergy:  [x]NO      []YES  Preferred Language for Healthcare:   [x]English       []other:    Functional Scale:           Date assessed:  FOTO physical FS primary measure score = 59 ; risk adjusted = 52  22     Pain level:  4-5/10     SUBJECTIVE: Pt getting ready for son's birthday party this weekend, has been scrubbing walls and lifting a lot which may have aggravated shoulder again. Reporting more numbness/tingling down arm as well. Has been doing HEP exercises every day w/ exception of last 2 days d/t schedule and preparing for birthday party.     OBJECTIVE:    Observation:  - excellent scapular retraction ability   Test measurements:      RESTRICTIONS/PRECAUTIONS: R Shld Dislocations     Exercises/Interventions:   Therapeutic Exercise (00972) Resistance / level Sets / Seconds  Reps Notes/Cues   UBE fwd/bwd  2' ea  7/13 - added bwd   Doorway Pec stretch      Standing ER w/ Band Orange  2 15 7/13 - ^ reps   Standing shld FLEX w/ chin tuck     3-way tap on wall     LPD     Anti rotation abduction - ant  Cueing to stay in abduction    TB IR Lime  2 15 7/13 - added   TB mid rows Lime 2 15 7/13 - added   TB ext Lime  2 15 7/13 - added   Standing shld flex 3# 1 15 7/13 - added   CC LPD 15# 1 15 7/13 - added                 Therapeutic Activities (82308)       Pt Education     See eval                                Neuromuscular Re-ed (82861)       Chin tucks      Prone mid row  #2 2 15    Prone shld ext #2 2 15     Prone B shld ext w/ hold      ER w/ Scap retract    Shoulder taps  2 10 7/13 - progressed to floor   Rhythmic stabilization   4 30\" 7/13 - added   Serratus punches 2# 2 15 7/13 - added   MB tosses against wall arm at 90/90 and OH 1.1# 1 20 ea 7/13 - added; OH reproducing symptoms          Manual Intervention (41342)       Prone CT jx manip       Supine thoracic manip    Cavitation    Seated CT jx Manip       Cervical Soft tissue       Sub-occipital release      Trigger point release    B upper trap                           Modalities:     Pt. Education:  -pt educated on diagnosis, prognosis and expectations for rehab  -all pt questions were answered    Home Exercise Program:  HEP instruction: Written HEP instructions provided and reviewed   Access Code: [email or web address]  URL: [email or web address]  Date: 07/01/2022  Prepared by: Andra Marr     Exercises  Supine Chin Tuck - 1 x daily - 7 x weekly - 10 reps - 5 hold  Shoulder External Rotation and Scapular Retraction with Resistance - 1 x daily - 7 x weekly - 3 sets - 10 reps  Prone Shoulder Row - 1 x daily - 7 x weekly - 3 sets - 10 reps  Prone Shoulder Extension - Single Arm - 1 x daily - 7 x weekly - 3 sets - 10 reps  Prone Scapular Slide with Shoulder Extension - 1 x daily - 7 x weekly - 3 sets - 10 reps - 5 hold    Therapeutic Exercise and NMR EXR  [x] (80919) Provided verbal/tactile cueing for activities related to strengthening, flexibility, endurance, ROM  for improvements in scapular, scapulothoracic and UE control with self care, reaching, carrying, lifting, house/yardwork, driving/computer work. [x] (88679) Provided verbal/tactile cueing for activities related to improving balance, coordination, kinesthetic sense, posture, motor skill, proprioception  to assist with  scapular, scapulothoracic and UE control with self care, reaching, carrying, lifting, house/yardwork, driving/computer work.  [] (16550) Therapist is in constant attendance of 2 or more patients providing skilled therapy interventions, but not providing any significant amount of measurable one-on-one time to either patient, for improvements in cervical, scapular, scapulothoracic and UE control with self care, reaching, carrying, lifting, house/yardwork, driving, computer work. Therapeutic Activities:    [x] (96843 or 40209) Provided verbal/tactile cueing for activities related to improving balance, coordination, kinesthetic sense, posture, motor skill, proprioception and motor activation to allow for proper function of scapular, scapulothoracic and UE control with self care, carrying, lifting, driving/computer work.      Home Exercise Program:    [] (19306) Reviewed/Progressed HEP activities related to strengthening, flexibility, endurance, ROM of scapular, scapulothoracic and UE control with self care, reaching, carrying, lifting, house/yardwork, driving/computer work  [] (17440) Reviewed/Progressed HEP activities related to improving balance, coordination, kinesthetic sense, posture, motor skill, proprioception of scapular, scapulothoracic and UE control with self care, reaching, carrying, lifting, house/yardwork, driving/computer work      Manual Treatments:  PROM / STM / Oscillations-Mobs:  G-I, II, III, IV (PA's, Inf., Post.)  [] (69972) Provided manual therapy to mobilize soft tissue/joints of cervical/CT, scapular GHJ and UE for the purpose of modulating pain, promoting relaxation,  increasing ROM, reducing/eliminating soft tissue swelling/inflammation/restriction, improving soft tissue extensibility and allowing for proper ROM for normal function with self care, reaching, carrying, lifting, house/yardwork, driving/computer work    Charges:  Timed Code Treatment Minutes: 47   Total Treatment Minutes: 47     [] EVAL - LOW (36222)   [] EVAL - MOD (90148)  [] EVAL - HIGH (10932)  [] RE-EVAL (39552)  [x] FV(08099) x 2      [] Ionto  [x] NMR (70733) x 1      [] Vaso  [] Manual (03269) x       [] Ultrasound  [] TA x      [] Mech Traction (32437)  [] Aquatic Therapy x      [] ES (un) (35171):   [] Home Management Training x  [] ES(attended) (78705)   [] Dry Needling 1-2 muscles (07058):  [] Dry Needling 3+ muscles (416301  [] Group:      [] Other:                    GOALS:  Patient stated goal: decreased pain   [x]? Progressing: []? Met: []? Not Met: []? Adjusted     Therapist goals for Patient:   Short Term Goals: To be achieved in: 2 weeks  1. Independent in HEP and progression per patient tolerance, in order to prevent re-injury. [x]? Progressing: []? Met: []? Not Met: []? Adjusted  2. Patient will have a decrease in pain to facilitate improvement in movement, function, and ADLs as indicated by Functional Deficits. [x]? Progressing: []? Met: []? Not Met: []? Adjusted     Long Term Goals: To be achieved in: 8 weeks  1. FOTO score of at least 70 to assist with reaching prior level of function. []? Progressing: []? Met: []? Not Met: []? Adjusted  2. Patient will demonstrate increased AROM to >170 to allow for proper joint functioning as indicated by patients Functional Deficits. []? Progressing: []? Met: []? Not Met: []? Adjusted  3.  Patient will demonstrate an increase in Gross RUE Strength to 4+/5 to allow for proper functional mobility as indicated by patients Functional Deficits. []? Progressing: []? Met: []? Not Met: []? Adjusted  4. Patient will return to functional activities including lifting 5# weight overhead without increased symptoms or restriction to be able to carry groceries. []? Progressing: []? Met: []? Not Met: []? Adjusted  5. Pt will report no pain or increased symptoms w/ self care activities such as dressing, hair care to return to PLOF. []? Progressing: []? Met: []? Not Met: []? Adjusted         Overall Progression Towards Functional goals/ Treatment Progress Update:  [] Patient is progressing as expected towards functional goals listed. [] Progression is slowed due to complexities/Impairments listed. [] Progression has been slowed due to co-morbidities. [x] Plan just implemented, too soon to assess goals progression <30days   [] Goals require adjustment due to lack of progress  [] Patient is not progressing as expected and requires additional follow up with physician  [] Other    Persisting Functional Limitations/Impairments:  []Sitting []Standing   []Transfers  []Sleeping   []Reaching []Lifting   []ADLs []Housework  []Driving []Job related tasks  []Sports/Recreation []Other:    ASSESSMENT: Pt tolerating addition of several new exercises w/ no reported increase in pain or symptoms besides OH MB throws against wall, which resulted in pain and N&T down R UE. Pt progressing well towards all LTGs at this time. Skilled therapy will work to improve ROM, strength, endurance, lifting tolerance, and reduce pain and N&T in order to improve QOL and return to PLOF. Continue to challenge pt w/ more advanced exercises as able to tolerate, including WB activities without DL.     Treatment/Activity Tolerance:  [x] Pt able to complete treatment [] Patient limited by fatique  [] Patient limited by pain  [] Patient limited by other medical complications  [] Other:     Prognosis:  [x] Good [] Fair  [] Poor    Patient Requires Follow-up: [x] Yes  [] No    Return to Play:    [x]  N/A      PLAN: See eval. PT 1-2x / week for 6-8 weeks. [x] Continue per plan of care [] Alter current plan (see comments)  [x] Plan of care initiated [] Hold pending MD visit [] Discharge    Electronically signed by: Kirk Buckley, PT, DPT, OMT-C  Therapist was present, directed the patient's care, made skilled judgement, and was responsible for assessment and treatment of the patient. Lilia Lei, SPT    Note: If patient does not return for scheduled/ recommended follow up visits, this note will serve as a discharge from care along with most recent update on progress.

## 2022-07-15 ENCOUNTER — HOSPITAL ENCOUNTER (OUTPATIENT)
Dept: PHYSICAL THERAPY | Age: 32
Setting detail: THERAPIES SERIES
Discharge: HOME | End: 2022-07-15
Payer: COMMERCIAL

## 2022-07-15 PROCEDURE — 97112 NEUROMUSCULAR REEDUCATION: CPT

## 2022-07-15 PROCEDURE — 97140 MANUAL THERAPY 1/> REGIONS: CPT

## 2022-07-15 NOTE — FLOWSHEET NOTE
[address]  Phone: [phone]   Fax: [phone]    Physical Therapy Treatment Note/ Progress Report:     Date:  7/15/2022    Patient Name:  Christ Cardenas    :  1990  MRN: [id number]  Restrictions/Precautions:    Medical/Treatment Diagnosis Information:  Diagnosis: A99.233Y (ICD-10-CM) - Shoulder subluxation, right, sequela  Treatment Diagnosis: Decreased R shld AROM, strength, and impaired functional mobility. Decreased cervical AROM and radiating symptoms. Insurance/Certification information:   [place] (90/10 after deductible)- [place]; Dry needling not covered  Physician Information:  Avila Strange MD    Plan of care signed (Y/N): [x]  Yes []  No     Date of Patient follow up with Physician: spine - 22     Progress Report: []  Yes  [x]  No     Date Range for reporting period:  Beginnin22  Ending: TBD     Progress report due (10 Rx/or 30 days whichever is less): visit #10 or 98     Recertification due (POC duration/ or 90 days whichever is less): visit #16 or 22 (8 weeks)     Visit # Insurance Allowable Auth required? Date Range   5 [place] []  Yes  []  No PCY     Latex Allergy:  [x]NO      []YES  Preferred Language for Healthcare:   [x]English       []other:    Functional Scale:           Date assessed:  FOTO physical FS primary measure score = 59 ; risk adjusted = 52  22     Pain level:  7.5/10     SUBJECTIVE: Pt stated increased pain and soreness following last session and preparing for child's birthday party. Noticed increased numbness and tingling in arm following decorating cookies.      OBJECTIVE:   Observation:  - excellent scapular retraction ability  Test measurements:      RESTRICTIONS/PRECAUTIONS: R Shld Dislocations     Exercises/Interventions:   Therapeutic Exercise (52807) Resistance / level Sets / Seconds  Reps Notes/Cues   UBE fwd/bwd  2' ea   - added bwd   Doorway Pec stretch      Standing ER w/ Band Orange  2 15 7/13 - ^ reps   Standing shld FLEX w/ chin tuck     3-way tap on wall     LPD     Anti rotation abduction - ant  [place]  2 10ea  Cueing to stay in abduction    TB IR [place]  2 15 7/13 - added   TB mid rows 7/13 - added   TB ext 7/13 - added   Standing shld flex 7/13 - added   CC LPD 7/13 - added                 Therapeutic Activities (55193)       Pt Education    See eval                                Neuromuscular Re-ed (45611)       Chin tucks      Prone mid row  #2 2 10    Prone shld ext  2 10    Prone B shld ext w/ hold      ER w/ Scap retract    Shoulder taps 2 10 7/13 - progressed to floor   Rhythmic stabilization   4 30\" 7/13 - added   Serratus punches 2# 2 10 7/13 - added   MB tosses against wall arm at 90/90 and [place] 7/13 - added; [place] reproducing symptoms   Ball on wall 3 ways Flex/ABD 1# ball 1 15ea    Body blades fwd  Yellow 1 30\" Reproduced symptoms    Banded ER w/ elevation on wall w/ foam roller Orange  2 10  Cueing to maintain ER positioning          Manual Intervention (41749)       Prone CT jx manip       Supine thoracic manip    [place]    Seated CT jx Manip       Cervical Soft tissue       Sub-occipital release   4'     Trigger point release   5'  B upper trap    Cervical traction   10'                     Modalities:     Pt. Education:  -pt educated on diagnosis, prognosis and expectations for rehab  -all pt questions were answered    Home Exercise Program:  HEP instruction: Written HEP instructions provided and reviewed   Access Code: [email or web address]  URL: [email or web address]  Date: 07/01/2022  Prepared by: Amol Webber     Exercises  Supine Chin Tuck - 1 x daily - 7 x weekly - 10 reps - 5 hold  Shoulder External Rotation and Scapular Retraction with Resistance - 1 x daily - 7 x weekly - 3 sets - 10 reps  Prone Shoulder Row - 1 x daily - 7 x weekly - 3 sets - 10 reps  Prone Shoulder Extension - Single Arm - 1 x daily - 7 x weekly - 3 sets - 10 reps  Prone Scapular Slide with Shoulder Extension - 1 x daily - 7 x weekly - 3 sets - 10 reps - 5 hold    Therapeutic Exercise and NMR EXR  [x] (60098) Provided verbal/tactile cueing for activities related to strengthening, flexibility, endurance, ROM  for improvements in scapular, scapulothoracic and UE control with self care, reaching, carrying, lifting, house/yardwork, driving/computer work. [x] (64303) Provided verbal/tactile cueing for activities related to improving balance, coordination, kinesthetic sense, posture, motor skill, proprioception  to assist with  scapular, scapulothoracic and UE control with self care, reaching, carrying, lifting, house/yardwork, driving/computer work.  [] (21567) Therapist is in constant attendance of 2 or more patients providing skilled therapy interventions, but not providing any significant amount of measurable one-on-one time to either patient, for improvements in cervical, scapular, scapulothoracic and UE control with self care, reaching, carrying, lifting, house/yardwork, driving, computer work. Therapeutic Activities:    [x] (93212 or 27966) Provided verbal/tactile cueing for activities related to improving balance, coordination, kinesthetic sense, posture, motor skill, proprioception and motor activation to allow for proper function of scapular, scapulothoracic and UE control with self care, carrying, lifting, driving/computer work.      Home Exercise Program:    [] (55173) Reviewed/Progressed HEP activities related to strengthening, flexibility, endurance, ROM of scapular, scapulothoracic and UE control with self care, reaching, carrying, lifting, house/yardwork, driving/computer work  [] (33175) Reviewed/Progressed HEP activities related to improving balance, coordination, kinesthetic sense, posture, motor skill, proprioception of scapular, scapulothoracic and UE control with self care, reaching, carrying, lifting, house/yardwork, driving/computer work      Manual Treatments:  PROM / STM / Oscillations-Mobs:  G-I, II, III, IV ([place], Inf., Post.)  [] (91373) Provided manual therapy to mobilize soft tissue/joints of cervical/CT, scapular GHJ and UE for the purpose of modulating pain, promoting relaxation,  increasing ROM, reducing/eliminating soft tissue swelling/inflammation/restriction, improving soft tissue extensibility and allowing for proper ROM for normal function with self care, reaching, carrying, lifting, house/yardwork, driving/computer work    Charges:  Timed Code Treatment Minutes: 47   Total Treatment Minutes: 47     [] EVAL - LOW (12841)   [] EVAL - MOD (40519)  [] EVAL - HIGH (43650)  [] RE-EVAL (99460)  [] YF(09430) x       [] Ionto  [x] NMR (24653) x 2      [] Vaso  [x] Manual (47410) x  1     [] Ultrasound  [] TA x      [] Mech Traction (52961)  [] Aquatic Therapy x      [] ES (un) (69639):   [] Home Management Training x  [] ES(attended) (16447)   [] Dry Needling 1-2 muscles (90449):  [] Dry Needling 3+ muscles (362870  [] Group:      [] Other:                    GOALS:  Patient stated goal: decreased pain   [x] Progressing: [] Met: [] Not Met: [] Adjusted     Therapist goals for Patient:   Short Term Goals: To be achieved in: 2 weeks  1. Independent in HEP and progression per patient tolerance, in order to prevent re-injury. [x] Progressing: [] Met: [] Not Met: [] Adjusted  2. Patient will have a decrease in pain to facilitate improvement in movement, function, and ADLs as indicated by Functional Deficits. [x] Progressing: [] Met: [] Not Met: [] Adjusted     Long Term Goals: To be achieved in: 8 weeks  1. FOTO score of at least 70 to assist with reaching prior level of function. [] Progressing: [] Met: [] Not Met: [] Adjusted  2. Patient will demonstrate increased AROM to >170 to allow for proper joint functioning as indicated by patients Functional Deficits. [] Progressing: [] Met: [] Not Met: [] Adjusted  3.  Patient will demonstrate an increase in Gross RUE Strength to 4+/5 to allow for proper functional mobility as indicated by patients Functional Deficits. [] Progressing: [] Met: [] Not Met: [] Adjusted  4. Patient will return to functional activities including lifting 5# weight overhead without increased symptoms or restriction to be able to carry groceries. [] Progressing: [] Met: [] Not Met: [] Adjusted  5. Pt will report no pain or increased symptoms w/ self care activities such as dressing, hair care to return to PLOF. [] Progressing: [] Met: [] Not Met: [] Adjusted         Overall Progression Towards Functional goals/ Treatment Progress Update:  [] Patient is progressing as expected towards functional goals listed. [] Progression is slowed due to complexities/Impairments listed. [] Progression has been slowed due to co-morbidities. [x] Plan just implemented, too soon to assess goals progression <30days   [] Goals require adjustment due to lack of progress  [] Patient is not progressing as expected and requires additional follow up with physician  [] Other    Persisting Functional Limitations/Impairments:  []Sitting []Standing   []Transfers  []Sleeping   []Reaching []Lifting   []ADLs []Housework  []Driving []Job related tasks  []Sports/Recreation []Other:    ASSESSMENT: Pt tolerated tx session w/ c/o of numbness and tingling w/body blade exercise. Manual cervical traction trialed per MD request to calm paresthesia symptoms. Pt had increased fatigue w/ high repetitions. Continue to strengthen periscapular musculature and increase postural awareness. Pt required cueing to maintain abduction and ER positions in banded exercises mentioned above. Skilled therapy will work to improve ROM, strength, endurance, lifting tolerance, and reduce pain and N&T in order to improve QOL and return to PLOF. Continue to challenge pt w/ more advanced exercises as able to tolerate, including WB activities without DL.     Treatment/Activity Tolerance:  [x] Pt able to complete treatment [] Patient limited by fatique  [] Patient limited by pain  [] Patient limited by other medical complications  [] Other:     Prognosis:  [x] Good [] Fair  [] Poor    Patient Requires Follow-up: [x] Yes  [] No    Return to Play:    [x]  N/A      PLAN: See eval. PT 1-2x / week for 6-8 weeks. [x] Continue per plan of care [] Alter current plan (see comments)  [x] Plan of care initiated [] Hold pending MD visit [] Discharge    Electronically signed by: Ying Fish, PT, DPT, OMT-C    Therapist was present, directed the patient's care, made skilled judgement, and was responsible for assessment and treatment of the patient. Karina Carbajal, SPT    Note: If patient does not return for scheduled/ recommended follow up visits, this note will serve as a discharge from care along with most recent update on progress.

## 2022-07-20 ENCOUNTER — HOSPITAL ENCOUNTER (OUTPATIENT)
Dept: PHYSICAL THERAPY | Age: 32
Setting detail: THERAPIES SERIES
Discharge: HOME | End: 2022-07-20
Payer: COMMERCIAL

## 2022-07-20 PROCEDURE — 97110 THERAPEUTIC EXERCISES: CPT | Performed by: SPECIALIST/TECHNOLOGIST

## 2022-07-20 PROCEDURE — 97140 MANUAL THERAPY 1/> REGIONS: CPT | Performed by: SPECIALIST/TECHNOLOGIST

## 2022-07-20 PROCEDURE — 97112 NEUROMUSCULAR REEDUCATION: CPT | Performed by: SPECIALIST/TECHNOLOGIST

## 2022-07-20 PROCEDURE — 97012 MECHANICAL TRACTION THERAPY: CPT | Performed by: SPECIALIST/TECHNOLOGIST

## 2022-07-20 NOTE — FLOWSHEET NOTE
[address]  Phone: [phone]   Fax: [phone]    Physical Therapy Treatment Note/ Progress Report:     Date:  2022    Patient Name:  Juliana Kennedy    :  1990  MRN: [id number]  Restrictions/Precautions:    Medical/Treatment Diagnosis Information:  Diagnosis: S18.802T (ICD-10-CM) - Shoulder subluxation, right, sequela  Treatment Diagnosis: Decreased R shld AROM, strength, and impaired functional mobility. Decreased cervical AROM and radiating symptoms. Insurance/Certification information:   [place] (90/10 [place]  Physician Information:  Mohan Gallegos MD    Plan of care signed (Y/N): [x]  Yes []  No     Date of Patient follow up with Physician: spine - 22     Progress Report: []  Yes  [x]  No     Date Range for reporting period:  Beginnin22  Ending: TBD     Progress report due (10 Rx/or 30 days whichever is less): visit #10 or 78     Recertification due (POC duration/ or 90 days whichever is less): visit #16 or 22 (8 weeks)     Visit # Insurance Allowable Auth required? Date Range   6 [place] []  Yes  []  No [place]Y     Latex Allergy:  [x]NO      []YES  Preferred Language for Healthcare:   [x]English       []other:    Functional Scale:           Date assessed:  TO physical FS primary measure score = 59 ; risk adjusted = 52  22     Pain level:  3/10  neck entering clinic but elevated after UBE    Rt. shoulder 2/ 10     SUBJECTIVE: Pt reports her Rt shoulder doing well today. Most of her pain today is in her neck, but     OBJECTIVE:   Observation:  - Excellent scapular retraction ability  Test measurements:      RESTRICTIONS/PRECAUTIONS: R Shld Dislocations     Exercises/Interventions: 36'  Therapeutic Exercise (02974) 10' Resistance / level Sets / Seconds  Reps Notes/Cues   UBE fwd/bwd  2' ea   - d/c NPV?     Doorway Pec stretch      Standing ER w/ Band [place]  2 15 flexibility, endurance, ROM  for improvements in scapular, scapulothoracic and UE control with self care, reaching, carrying, lifting, house/yardwork, driving/computer work. [x] (33476) Provided verbal/tactile cueing for activities related to improving balance, coordination, kinesthetic sense, posture, motor skill, proprioception  to assist with  scapular, scapulothoracic and UE control with self care, reaching, carrying, lifting, house/yardwork, driving/computer work.  [] (66153) Therapist is in constant attendance of 2 or more patients providing skilled therapy interventions, but not providing any significant amount of measurable one-on-one time to either patient, for improvements in cervical, scapular, scapulothoracic and UE control with self care, reaching, carrying, lifting, house/yardwork, driving, computer work. Therapeutic Activities:    [x] (66187 or 74480) Provided verbal/tactile cueing for activities related to improving balance, coordination, kinesthetic sense, posture, motor skill, proprioception and motor activation to allow for proper function of scapular, scapulothoracic and UE control with self care, carrying, lifting, driving/computer work.      Home Exercise Program:    [] (68234) Reviewed/Progressed HEP activities related to strengthening, flexibility, endurance, ROM of scapular, scapulothoracic and UE control with self care, reaching, carrying, lifting, house/yardwork, driving/computer work  [] (15087) Reviewed/Progressed HEP activities related to improving balance, coordination, kinesthetic sense, posture, motor skill, proprioception of scapular, scapulothoracic and UE control with self care, reaching, carrying, lifting, house/yardwork, driving/computer work      Manual Treatments:  PROM / STM / Oscillations-Mobs:  G-I, II, III, IV (PA's, Inf., Post.)  [] (37571) Provided manual therapy to mobilize soft tissue/joints of cervical/CT, scapular GHJ and UE for the purpose of modulating pain, promoting relaxation,  increasing ROM, reducing/eliminating soft tissue swelling/inflammation/restriction, improving soft tissue extensibility and allowing for proper ROM for normal function with self care, reaching, carrying, lifting, house/yardwork, driving/computer work    Charges:  Timed Code Treatment Minutes: 47   Total Treatment Minutes: 47     [] EVAL - LOW (28927)   [] EVAL - MOD (43201)  [] EVAL - HIGH (72247)  [] RE-EVAL (94194)  [x] BR(45572) x 1      [] Ionto  [x] NMR (10766) x 1      [] Vaso  [x] Manual (26993) x  1     [] Ultrasound  [] TA x      [x] Mech Traction (83870)  10' 15# stand hold/ release  [] Aquatic Therapy x      [] ES (un) (57343):   [] Home Management Training x  [] ES(attended) (18064)   [] Dry Needling 1-2 muscles (92075):  [] Dry Needling 3+ muscles (283456  [] Group:      [] Other:                    GOALS:  Patient stated goal: decreased pain   [x] Progressing: [] Met: [] Not Met: [] Adjusted     Therapist goals for Patient:   Short Term Goals: To be achieved in: 2 weeks  1. Independent in HEP and progression per patient tolerance, in order to prevent re-injury. [x] Progressing: [] Met: [] Not Met: [] Adjusted  2. Patient will have a decrease in pain to facilitate improvement in movement, function, and ADLs as indicated by Functional Deficits. [x] Progressing: [] Met: [] Not Met: [] Adjusted     Long Term Goals: To be achieved in: 8 weeks  1. FOTO score of at least 70 to assist with reaching prior level of function. [] Progressing: [] Met: [] Not Met: [] Adjusted  2. Patient will demonstrate increased AROM to >170 to allow for proper joint functioning as indicated by patients Functional Deficits. [] Progressing: [] Met: [] Not Met: [] Adjusted  3. Patient will demonstrate an increase in Gross RUE Strength to 4+/5 to allow for proper functional mobility as indicated by patients Functional Deficits. [] Progressing: [] Met: [] Not Met: [] Adjusted  4.  Patient will return to functional activities including lifting 5# weight overhead without increased symptoms or restriction to be able to carry groceries. [] Progressing: [] Met: [] Not Met: [] Adjusted  5. Pt will report no pain or increased symptoms w/ self care activities such as dressing, hair care to return to PLOF. [] Progressing: [] Met: [] Not Met: [] Adjusted         Overall Progression Towards Functional goals/ Treatment Progress Update:  [] Patient is progressing as expected towards functional goals listed. [] Progression is slowed due to complexities/Impairments listed. [] Progression has been slowed due to co-morbidities. [x] Plan just implemented, too soon to assess goals progression <30days   [] Goals require adjustment due to lack of progress  [] Patient is not progressing as expected and requires additional follow up with physician  [] Other    Persisting Functional Limitations/Impairments:  []Sitting []Standing   []Transfers  []Sleeping   []Reaching []Lifting   []ADLs []Housework  []Driving []Job related tasks  []Sports/Recreation []Other:    ASSESSMENT: Pt tolerated tx session w/ c/o of numbness and tingling w/body blade exercise. Mechanical cervical traction trialed per MD request to calm paresthesia symptoms and reported no change in symptoms or worsening in symptoms today. Pt had increased fatigue w/ high repetitions especially w/ prone series and working B arms at same time. Continue to strengthen periscapular musculature and increase postural awareness. Prone scaption increased her discomfort to her upper traps and was unable to complete. Skilled therapy will work to improve ROM, strength, endurance, lifting tolerance, and reduce pain and N&T in order to improve QOL and return to PLOF. Continue to challenge pt w/ more advanced exercises as able to tolerate, including WB activities without DL.     Treatment/Activity Tolerance:  [x] Pt able to complete treatment [] Patient limited by jessica  [] Patient limited by pain  [] Patient limited by other medical complications  [] Other:     Prognosis:  [x] Good [] Fair  [] Poor    Patient Requires Follow-up: [x] Yes  [] No    Return to Play:    [x]  N/A      PLAN: PT 1-2x / week for 6-8 weeks. [x] Continue per plan of care [] Alter current plan (see comments)  [] Plan of care initiated [] Hold pending MD visit [] Discharge    Electronically signed by: Donald More, [facility], [id number]    Therapist was present, directed the patient's care, made skilled judgement, and was responsible for assessment and treatment of the patient. Abdi Son, SPT    Note: If patient does not return for scheduled/ recommended follow up visits, this note will serve as a discharge from care along with most recent update on progress.

## 2022-07-22 ENCOUNTER — HOSPITAL ENCOUNTER (OUTPATIENT)
Dept: PHYSICAL THERAPY | Age: 32
Setting detail: THERAPIES SERIES
Discharge: HOME | End: 2022-07-22
Payer: COMMERCIAL

## 2022-07-22 PROCEDURE — 97110 THERAPEUTIC EXERCISES: CPT

## 2022-07-22 PROCEDURE — 97140 MANUAL THERAPY 1/> REGIONS: CPT

## 2022-07-22 PROCEDURE — 97112 NEUROMUSCULAR REEDUCATION: CPT

## 2022-07-22 PROCEDURE — 97012 MECHANICAL TRACTION THERAPY: CPT

## 2022-07-22 NOTE — FLOWSHEET NOTE
[address]  Phone: [phone]   Fax: [phone]    Physical Therapy Treatment Note/ Progress Report:     Date:  2022    Patient Name:  Felicia Ann    :  1990  MRN: [id number]  Restrictions/Precautions:    Medical/Treatment Diagnosis Information:  Diagnosis: R56.742K (ICD-10-CM) - Shoulder subluxation, right, sequela  Treatment Diagnosis: Decreased R shld AROM, strength, and impaired functional mobility. Decreased cervical AROM and radiating symptoms. Insurance/Certification information:   [place] (90/10 after deductible)- [place]; Dry needling not covered  Physician Information:  Duglas Franco MD    Plan of care signed (Y/N): [x]  Yes []  No     Date of Patient follow up with Physician: spine - TBD     Progress Report: []  Yes  [x]  No     Date Range for reporting period:  Beginnin22  Ending: TBD     Progress report due (10 Rx/or 30 days whichever is less): visit #10 or 68     Recertification due (POC duration/ or 90 days whichever is less): visit #16 or 22 (8 weeks)     Visit # Insurance Allowable Auth required? Date Range   7 [place] []  Yes  []  No [place]     Latex Allergy:  [x]NO      []YES  Preferred Language for Healthcare:   [x]English       []other:    Functional Scale:           Date assessed:  FOTO physical FS primary measure score = 59 ; risk adjusted = 52  22     Pain level:  1/10     SUBJECTIVE: Pt reports her Rt shoulder doing well today. Most of her pain today is in her neck, but her shoulder was in pain last night. OBJECTIVE:   Observation:  - Excellent scapular retraction ability  Test measurements:      RESTRICTIONS/PRECAUTIONS: R Shld Dislocations     Exercises/Interventions:   Therapeutic Exercise (70636)  Resistance / level Sets / Seconds  Reps Notes/Cues   UBE fwd/bwd  2' ea   - d/c NPV?     Doorway Pec stretch      Standing ER w/ Band [place]  1  - ^ reps   Standing shld FLEX w/ chin driving/computer work      Manual Treatments:  PROM / STM / Oscillations-Mobs:  G-I, II, III, IV (PA's, Inf., Post.)  [] (82334) Provided manual therapy to mobilize soft tissue/joints of cervical/CT, scapular GHJ and UE for the purpose of modulating pain, promoting relaxation,  increasing ROM, reducing/eliminating soft tissue swelling/inflammation/restriction, improving soft tissue extensibility and allowing for proper ROM for normal function with self care, reaching, carrying, lifting, house/yardwork, driving/computer work    Charges:  Timed Code Treatment Minutes: 46   Total Treatment Minutes: 56     [] EVAL - LOW (41432)   [] EVAL - MOD (95031)  [] EVAL - HIGH (61772)  [] RE-EVAL (73699)  [x] UX(73951) x 1      [] Ionto  [x] NMR (43349) x 1      [] Vaso  [x] Manual (88614) x  1     [] Ultrasound  [] TA x      [x] Mech Traction (07797)   [] Aquatic Therapy x      [] ES (un) (34861):   [] Home Management Training x  [] ES(attended) (26636)   [] Dry Needling 1-2 muscles (84437):  [] Dry Needling 3+ muscles (617505  [] Group:      [] Other:                    GOALS:  Patient stated goal: decreased pain   [x] Progressing: [] Met: [] Not Met: [] Adjusted     Therapist goals for Patient:   Short Term Goals: To be achieved in: 2 weeks  1. Independent in HEP and progression per patient tolerance, in order to prevent re-injury. [x] Progressing: [] Met: [] Not Met: [] Adjusted  2. Patient will have a decrease in pain to facilitate improvement in movement, function, and ADLs as indicated by Functional Deficits. [x] Progressing: [] Met: [] Not Met: [] Adjusted     Long Term Goals: To be achieved in: 8 weeks  1. FOTO score of at least 70 to assist with reaching prior level of function. [] Progressing: [] Met: [] Not Met: [] Adjusted  2. Patient will demonstrate increased AROM to >170 to allow for proper joint functioning as indicated by patients Functional Deficits. [] Progressing: [] Met: [] Not Met: [] Adjusted  3. Patient will demonstrate an increase in Gross RUE Strength to 4+/5 to allow for proper functional mobility as indicated by patients Functional Deficits. [] Progressing: [] Met: [] Not Met: [] Adjusted  4. Patient will return to functional activities including lifting 5# weight overhead without increased symptoms or restriction to be able to carry groceries. [] Progressing: [] Met: [] Not Met: [] Adjusted  5. Pt will report no pain or increased symptoms w/ self care activities such as dressing, hair care to return to PLOF. [] Progressing: [] Met: [] Not Met: [] Adjusted         Overall Progression Towards Functional goals/ Treatment Progress Update:  [] Patient is progressing as expected towards functional goals listed. [] Progression is slowed due to complexities/Impairments listed. [] Progression has been slowed due to co-morbidities. [x] Plan just implemented, too soon to assess goals progression <30days   [] Goals require adjustment due to lack of progress  [] Patient is not progressing as expected and requires additional follow up with physician  [] Other    Persisting Functional Limitations/Impairments:  []Sitting []Standing   []Transfers  []Sleeping   []Reaching []Lifting   []ADLs []Housework  []Driving []Job related tasks  []Sports/Recreation []Other:    ASSESSMENT: Pt tolerated tx session w/ no increase of pain. Mechanical cervical traction trialed per MD request to calm paresthesia symptoms and reported no change in symptoms or worsening in symptoms today. Pt had increased fatigue w/ high repetitions especially w/ prone series and working B arms at same time. Continue to strengthen periscapular musculature and increase postural awareness. Tolerated prone scaption better today w/ no pain. Pt was easily fatigued w/ high repetitions of exercises to re-educate periscapular musculature.   Skilled therapy will work to improve ROM, strength, endurance, lifting tolerance, and reduce pain and N&T in order to improve QOL and return to PLOF. Continue to challenge pt w/ more advanced exercises as able to tolerate, including WB activities without DL. Treatment/Activity Tolerance:  [x] Pt able to complete treatment [] Patient limited by fatique  [] Patient limited by pain  [] Patient limited by other medical complications  [] Other:     Prognosis:  [x] Good [] Fair  [] Poor    Patient Requires Follow-up: [x] Yes  [] No    Return to Play:    [x]  N/A      PLAN: PT 1-2x / week for 6-8 weeks. [x] Continue per plan of care [] Alter current plan (see comments)  [] Plan of care initiated [] Hold pending MD visit [] Discharge    Electronically signed by: Jayshree Green, PT, DPT, OMT-C     Therapist was present, directed the patient's care, made skilled judgement, and was responsible for assessment and treatment of the patient. SG Garcia    Note: If patient does not return for scheduled/ recommended follow up visits, this note will serve as a discharge from care along with most recent update on progress.

## 2022-07-26 ENCOUNTER — HOSPITAL ENCOUNTER (OUTPATIENT)
Dept: PHYSICAL THERAPY | Age: 32
Setting detail: THERAPIES SERIES
Discharge: HOME | End: 2022-07-26
Payer: COMMERCIAL

## 2022-07-26 PROCEDURE — 97012 MECHANICAL TRACTION THERAPY: CPT

## 2022-07-26 PROCEDURE — 97140 MANUAL THERAPY 1/> REGIONS: CPT

## 2022-07-26 PROCEDURE — 97112 NEUROMUSCULAR REEDUCATION: CPT

## 2022-08-02 ENCOUNTER — HOSPITAL ENCOUNTER (OUTPATIENT)
Dept: PHYSICAL THERAPY | Age: 32
Setting detail: THERAPIES SERIES
Discharge: HOME | End: 2022-08-02
Payer: COMMERCIAL

## 2022-08-02 PROCEDURE — 97110 THERAPEUTIC EXERCISES: CPT

## 2022-08-02 PROCEDURE — 97140 MANUAL THERAPY 1/> REGIONS: CPT

## 2022-08-02 PROCEDURE — 97012 MECHANICAL TRACTION THERAPY: CPT

## 2022-08-02 PROCEDURE — 97530 THERAPEUTIC ACTIVITIES: CPT

## 2022-08-02 NOTE — PROGRESS NOTES
[address]  Phone: [phone]   Fax: [phone]    Physical Therapy Treatment Note/ Progress Report:     Date:  2022    Patient Name:  Nina Galvin    :  1990  MRN: [id number]  Restrictions/Precautions:    Medical/Treatment Diagnosis Information:  Diagnosis: Z00.598R (ICD-10-CM) - Shoulder subluxation, right, sequela  Treatment Diagnosis: Decreased R shld AROM, strength, and impaired functional mobility. Decreased cervical AROM and radiating symptoms. Insurance/Certification information:   [place] (90[place]  Physician Information:  Mc Iniguez MD    Plan of care signed (Y/N): [x]  Yes []  No     Date of Patient follow up with Physician: spine - TBD     Progress Report: [x]  Yes  []  No     Date Range for reporting period:  Beginnin22  PN: 22  Ending: TBD     Progress report due (10 Rx/or 30 days whichever is less): visit #19 or 3/5/53     Recertification due (POC duration/ or 90 days whichever is less): visit #16 or 22 (8 weeks)     Visit # Insurance Allowable Auth required? Date Range   9 [place] []  Yes  []  [place]     Latex Allergy:  [x]NO      []YES  Preferred Language for Healthcare:   [x]English       []other:    Functional Scale:           Date assessed:  FOTO physical FS primary measure score = 59 ; risk adjusted = 52  22  FOTO physical FS primary measure score = 75     22       Pain level:  3/10 - neck    SUBJECTIVE: pt reports increased stiffness since last patient visit. Pt said that she is able to do most everything at home, but difficult because of having to carry around small children. Pt states that shoulder is feeling much better, but her neck is the most limiting factor at this point.      OBJECTIVE:   Observation:  - Excellent scapular retraction ability  Test measurements:      PROM AROM     L R L R   Cervical Flexion      45     Cervical Blue   2 12 ea     B [place]   1 10 ea           Manual Intervention (26204)       Prone CT jx manip       Supine thoracic manip   4'  [place]    Seated CT jx Manip       Cervical Soft tissue       Sub-occipital release   4'     Trigger point release   5'  B upper trap    Cervical traction       PA thoracic mobs  4'               Modalities: Mechanical cervical traction 10 min 23 lbs max; 12 lbs min; 30s/10s, 30% speed ; automatic stop handed to patient     Pt. Education:  -pt educated on diagnosis, prognosis and expectations for rehab  -all pt questions were answered    Home Exercise Program:  HEP instruction: Written HEP instructions provided and reviewed   Access Code: [email or web address]  URL: [email or web address]  Date: 07/01/2022  Prepared by: Joce Miller     Exercises  Supine Chin Tuck - 1 x daily - 7 x weekly - 10 reps - 5 hold  Shoulder External Rotation and Scapular Retraction with Resistance - 1 x daily - 7 x weekly - 3 sets - 10 reps  Prone Shoulder Row - 1 x daily - 7 x weekly - 3 sets - 10 reps  Prone Shoulder Extension - Single Arm - 1 x daily - 7 x weekly - 3 sets - 10 reps  Prone Scapular Slide with Shoulder Extension - 1 x daily - 7 x weekly - 3 sets - 10 reps - 5 hold    Therapeutic Exercise and NMR EXR  [x] (17304) Provided verbal/tactile cueing for activities related to strengthening, flexibility, endurance, ROM  for improvements in scapular, scapulothoracic and UE control with self care, reaching, carrying, lifting, house/yardwork, driving/computer work.     [x] (45650) Provided verbal/tactile cueing for activities related to improving balance, coordination, kinesthetic sense, posture, motor skill, proprioception  to assist with  scapular, scapulothoracic and UE control with self care, reaching, carrying, lifting, house/yardwork, driving/computer work.  [] (52854) Therapist is in constant attendance of 2 or more patients providing skilled therapy interventions, but not providing any significant amount of measurable one-on-one time to either patient, for improvements in cervical, scapular, scapulothoracic and UE control with self care, reaching, carrying, lifting, house/yardwork, driving, computer work. Therapeutic Activities:    [x] (47564 or 11253) Provided verbal/tactile cueing for activities related to improving balance, coordination, kinesthetic sense, posture, motor skill, proprioception and motor activation to allow for proper function of scapular, scapulothoracic and UE control with self care, carrying, lifting, driving/computer work.      Home Exercise Program:    [] (70436) Reviewed/Progressed HEP activities related to strengthening, flexibility, endurance, ROM of scapular, scapulothoracic and UE control with self care, reaching, carrying, lifting, house/yardwork, driving/computer work  [] (48458) Reviewed/Progressed HEP activities related to improving balance, coordination, kinesthetic sense, posture, motor skill, proprioception of scapular, scapulothoracic and UE control with self care, reaching, carrying, lifting, house/yardwork, driving/computer work      Manual Treatments:  PROM / STM / Oscillations-Mobs:  G-I, II, III, IV (PA's, Inf., Post.)  [] (57039) Provided manual therapy to mobilize soft tissue/joints of cervical/CT, scapular GHJ and UE for the purpose of modulating pain, promoting relaxation,  increasing ROM, reducing/eliminating soft tissue swelling/inflammation/restriction, improving soft tissue extensibility and allowing for proper ROM for normal function with self care, reaching, carrying, lifting, house/yardwork, driving/computer work    Charges:  Timed Code Treatment Minutes: 46   Total Treatment Minutes: 56     [] EVAL - LOW (38884)   [] EVAL - MOD (31062)  [] EVAL - HIGH (07100)  [] RE-EVAL (10754)  [x] UI(80564) x 1      [] Ionto  [] NMR (95990) x       [] Vaso  [x] Manual (46288) x  1     [] Ultrasound  [x] TA x 1     [x] Mech Traction (51221)   [] Aquatic Therapy x      [] ES (un) (22194):   [] Home Management Training x  [] ES(attended) (65652)   [] Dry Needling 1-2 muscles (42717):  [] Dry Needling 3+ muscles (954600  [] Group:      [] Other:                    GOALS:  Patient stated goal: decreased pain   [x] Progressing: [] Met: [] Not Met: [] Adjusted     Therapist goals for Patient:   Short Term Goals: To be achieved in: 2 weeks  1. Independent in HEP and progression per patient tolerance, in order to prevent re-injury. [] Progressing: [x] Met: [] Not Met: [] Adjusted  2. Patient will have a decrease in pain to facilitate improvement in movement, function, and ADLs as indicated by Functional Deficits. [x] Progressing: [] Met: [] Not Met: [] Adjusted     Long Term Goals: To be achieved in: 8 weeks  1. FOTO score of at least 70 to assist with reaching prior level of function. [] Progressing: [x] Met: [] Not Met: [] Adjusted  2. Patient will demonstrate increased AROM to >170 to allow for proper joint functioning as indicated by patients Functional Deficits. [] Progressing: [x] Met: [] Not Met: [] Adjusted  3. Patient will demonstrate an increase in Gross RUE Strength to 4+/5 to allow for proper functional mobility as indicated by patients Functional Deficits. [x] Progressing: [] Met: [] Not Met: [] Adjusted  4. Patient will return to functional activities including lifting 5# weight overhead without increased symptoms or restriction to be able to carry groceries. [x] Progressing: [] Met: [] Not Met: [] Adjusted  5. Pt will report no pain or increased symptoms w/ self care activities such as dressing, hair care to return to PLOF. [] Progressing: [x] Met: [] Not Met: [] Adjusted         Overall Progression Towards Functional goals/ Treatment Progress Update:  [x] Patient is progressing as expected towards functional goals listed. [] Progression is slowed due to complexities/Impairments listed.   [] Progression has been slowed due to co-morbidities. [] Plan just implemented, too soon to assess goals progression <30days   [] Goals require adjustment due to lack of progress  [] Patient is not progressing as expected and requires additional follow up with physician  [] Other    Persisting Functional Limitations/Impairments:  []Sitting []Standing   []Transfers  []Sleeping   []Reaching [x]Lifting   []ADLs [x]Housework  []Driving []Job related tasks  []Sports/Recreation []Other:    ASSESSMENT: Pn assessed today. Pt tolerated tx session w/ no increase of pain. Mechanical cervical traction trialed per MD request to calm paresthesia symptoms and reported no change in symptoms or worsening in symptoms today. Pt has increased R shld ROM and strength and continues to be limited by lack of endurance. However, pt continues to increase endurance each visit. Continue to strengthen periscapular musculature and increase postural awareness. Pt was educated on increasing core strength to help stabilize back. Skilled therapy will work to improve ROM, strength, endurance, lifting tolerance, and reduce pain and N&T in order to improve QOL and return to PLOF. Continue to challenge pt w/ more advanced exercises as able to tolerate, including WB activities without DL. Treatment/Activity Tolerance:  [x] Pt able to complete treatment [] Patient limited by fatique  [] Patient limited by pain  [] Patient limited by other medical complications  [] Other:     Prognosis:  [x] Good [] Fair  [] Poor    Patient Requires Follow-up: [x] Yes  [] No    Return to Play:    [x]  N/A      PLAN: PT 1-2x / week for 6-8 weeks. [x] Continue per plan of care [] Alter current plan (see comments)  [] Plan of care initiated [] Hold pending MD visit [] Discharge    Electronically signed by: Eliot Rashid, PT, DPT, OMT-C, CSMS    Therapist was present, directed the patient's care, made skilled judgement, and was responsible for assessment and treatment of the patient.       Abdulaziz Bryson, SPT    Note: If patient does not return for scheduled/ recommended follow up visits, this note will serve as a discharge from care along with most recent update on progress.

## 2022-08-05 ENCOUNTER — HOSPITAL ENCOUNTER (OUTPATIENT)
Dept: PHYSICAL THERAPY | Age: 32
Setting detail: THERAPIES SERIES
Discharge: HOME | End: 2022-08-05
Payer: COMMERCIAL

## 2022-08-05 NOTE — FLOWSHEET NOTE
Physical Therapy  Cancellation/No-show Note  Patient Name:  Cat Joel  :  1990   Date:  2022  Cancelled visits to date: 1  No-shows to date: 0    Patient status for today's appointment patient:  [x]  Cancelled    []  Rescheduled appointment  []  No-show       Reason given by patient:  []  Patient ill  []  Conflicting appointment  []  No transportation    []  Conflict with work  []  No reason given  [x]  Other:  No     Comments:      Phone call information:   []  Phone call made today to patient at _ time at number provided:      []  Patient answered, conversation as follows:    []  Patient did not answer, message left as follows:  []  Phone call not made today  [x] Phone call not made today - patient called in and provided reason for cancellation    Electronically signed by:  Mateo Worrell, PT, DPT

## 2022-08-09 ENCOUNTER — HOSPITAL ENCOUNTER (OUTPATIENT)
Dept: PHYSICAL THERAPY | Age: 32
Setting detail: THERAPIES SERIES
Discharge: HOME | End: 2022-08-09
Payer: COMMERCIAL

## 2022-08-09 PROCEDURE — 97110 THERAPEUTIC EXERCISES: CPT

## 2022-08-09 PROCEDURE — 97112 NEUROMUSCULAR REEDUCATION: CPT

## 2022-08-09 PROCEDURE — 97530 THERAPEUTIC ACTIVITIES: CPT

## 2022-08-09 NOTE — FLOWSHEET NOTE
[address]  Phone: [phone]   Fax: [phone]    Physical Therapy Treatment Note/ Progress Report:     Date:  2022    Patient Name:  Thor Cobian    :  1990  MRN: [id number]  Restrictions/Precautions:    Medical/Treatment Diagnosis Information:  Diagnosis: U67.353E (ICD-10-CM) - Shoulder subluxation, right, sequela  Treatment Diagnosis: Decreased R shld AROM, strength, and impaired functional mobility. Decreased cervical AROM and radiating symptoms. Insurance/Certification information:   [place] (/10 after deductible)- [place]; Dry needling not covered  Physician Information:  Candice Villa MD    Plan of care signed (Y/N): [x]  Yes []  No     Date of Patient follow up with Physician: spine - TBD     Progress Report: []  Yes  [x]  No     Date Range for reporting period:  Beginnin22  PN: 22  Ending: TBD     Progress report due (10 Rx/or 30 days whichever is less): visit #19 or 6/3/32     Recertification due (POC duration/ or 90 days whichever is less): visit #16 or 22 (8 weeks)     Visit # Insurance Allowable Auth required? Date Range   10 [place] []  Yes  []  No PCY     Latex Allergy:  [x]NO      []YES  Preferred Language for Healthcare:   [x]English       []other:    Functional Scale:           Date assessed:  FOTO physical FS primary measure score = 59 ; risk adjusted = 52  22  FOTO physical FS primary measure score = 75     22       Pain level:  3/10 - neck    SUBJECTIVE: Pt states over the weekend had to work on the bathroom sink and has very sore neck and shoulders. Reports some limited cervical ROM as well and felt stuck at times.      OBJECTIVE:   Observation:  - Excellent scapular retraction ability  Test measurements:      PROM AROM     L R L R   Cervical Flexion      45     Cervical Extension      60     Cervical Rotation   54 50   Cervical Side-bend   45 44   Shoulder Flexion    WNL WNL WNL Shoulder Abduction    WNL WNL WNL   Shoulder External Rotation    WNL WNL WNL   Shoulder Internal Rotation    WNL T3 T3         8/2 Strength (0-5) Left Right   Shoulder Shrug (C4) 5 5   Shoulder Flex 4+ 4+   Shoulder Abd (C5) 4+ 4-   Shoulder ER 5 4+   Shoulder IR 5 4+   Biceps (C6) 4+ 4+   Triceps (C7) 5 5       RESTRICTIONS/PRECAUTIONS: R Shld Dislocations     Exercises/Interventions:   Therapeutic Exercise (66149)  Resistance / level Sets / Seconds  Reps Notes/Cues   UBE fwd/bwd  2' ea  7/20 - d/c NPV?     AAROM cervical rotation w/ towel  10\" 5    Doorway Pec stretch      Standing B ER w/ Band Orange  1 12 7/13 - ^ reps   Standing shld FLEX w/ chin tuck     3-way tap on wall     LPD     Anti rotation abduction - ant  lime  2 10ea  Cueing to stay in abduction    TB IR Lime  2 15 7/13 - added   TB mid rows 7/13 - added   TB Ext 7/13 - added   Standing shld flex 7/13 - added   CC LPD   30# 2 15 7/20- added   CC Adduction/ eccentric abduction     Thoracic Ext stretch      TB - ER B lime 2 15    TB - Horz AB w/ palms up/down  1 15 each           Therapeutic Activities (77236)& Neuromuscular Re-ed (34855)       Postural education w/ mvmt mechanics  6'     Chin tucks      Prone mid row     Prone shld ext/ Horiz Abd   Hold prone scaptions   7/20 upper/lower traps   Prone I's, Cueing to pinch scapulas together   ER w/ Scap retract    Shoulder tapspush up position 7/13 - progressed to floor    Prone Rhythmic stabilization   7/13 - added   Serratus punches 7/20-      Banded ER w/ elevation on wall w/ foam roller Orange  2 15 Cueing to maintain ER positioning   Open book stretches   B Cervical isometrics Flex/Ext/RSB/LSB     Dead bugs on foam roller   Cueing to keep spine neutral   's carry  7.5# KB  Cueing to pinch shoulder blades back    Farmer's carry  20# KB  Cueing to pinch shoulder blades back    Paloff press     B Landmine Press            Manual Intervention (10118)       Mid cervical manip  4'     Prone CT jx manip    4'     Supine thoracic manip   4'  Cavitation    Seated CT jx Manip       Cervical Soft tissue       Sub-occipital release      Trigger point release   5'  B upper trap    Cervical traction       PA thoracic mobs               Modalities: M  8/9 - sent DN request to Dr. Gabbi Mccoy via inbox message    Pt. Education:  -pt educated on diagnosis, prognosis and expectations for rehab  -all pt questions were answered    Home Exercise Program:  HEP instruction: Written HEP instructions provided and reviewed   Access Code: [email or web address]  URL: [email or web address]  Date: 07/01/2022  Prepared by: mAol Webber     Exercises  Supine Chin Tuck - 1 x daily - 7 x weekly - 10 reps - 5 hold  Shoulder External Rotation and Scapular Retraction with Resistance - 1 x daily - 7 x weekly - 3 sets - 10 reps  Prone Shoulder Row - 1 x daily - 7 x weekly - 3 sets - 10 reps  Prone Shoulder Extension - Single Arm - 1 x daily - 7 x weekly - 3 sets - 10 reps  Prone Scapular Slide with Shoulder Extension - 1 x daily - 7 x weekly - 3 sets - 10 reps - 5 hold    Therapeutic Exercise and NMR EXR  [x] (72484) Provided verbal/tactile cueing for activities related to strengthening, flexibility, endurance, ROM  for improvements in scapular, scapulothoracic and UE control with self care, reaching, carrying, lifting, house/yardwork, driving/computer work.     [x] (68360) Provided verbal/tactile cueing for activities related to improving balance, coordination, kinesthetic sense, posture, motor skill, proprioception  to assist with  scapular, scapulothoracic and UE control with self care, reaching, carrying, lifting, house/yardwork, driving/computer work.  [] (06513) Therapist is in constant attendance of 2 or more patients providing skilled therapy interventions, but not providing any significant amount of measurable one-on-one time to either patient, for improvements in cervical, scapular, scapulothoracic and UE control with self care, reaching, carrying, lifting, house/yardwork, driving, computer work. Therapeutic Activities:    [x] (05890 or 69675) Provided verbal/tactile cueing for activities related to improving balance, coordination, kinesthetic sense, posture, motor skill, proprioception and motor activation to allow for proper function of scapular, scapulothoracic and UE control with self care, carrying, lifting, driving/computer work.      Home Exercise Program:    [] (85243) Reviewed/Progressed HEP activities related to strengthening, flexibility, endurance, ROM of scapular, scapulothoracic and UE control with self care, reaching, carrying, lifting, house/yardwork, driving/computer work  [] (00828) Reviewed/Progressed HEP activities related to improving balance, coordination, kinesthetic sense, posture, motor skill, proprioception of scapular, scapulothoracic and UE control with self care, reaching, carrying, lifting, house/yardwork, driving/computer work      Manual Treatments:  PROM / STM / Oscillations-Mobs:  G-I, II, III, IV (PA's, Inf., Post.)  [] (16218) Provided manual therapy to mobilize soft tissue/joints of cervical/CT, scapular GHJ and UE for the purpose of modulating pain, promoting relaxation,  increasing ROM, reducing/eliminating soft tissue swelling/inflammation/restriction, improving soft tissue extensibility and allowing for proper ROM for normal function with self care, reaching, carrying, lifting, house/yardwork, driving/computer work    Charges:  Timed Code Treatment Minutes: 43   Total Treatment Minutes: 43     [] EVAL - LOW (01676)   [] EVAL - MOD (30458)  [] EVAL - HIGH (36593)  [] RE-EVAL (04607)  [x] JZ(52963) x 1      [] Ionto  [x] NMR (85343) x  1     [] Vaso  [x] Manual (48290) x  1     [] Ultrasound  [] TA x 1     [] Mech Traction (37450)   [] Aquatic Therapy x      [] ES (un) (95218):   [] Home Management Training x  [] ES(attended) (08463)   [] Dry Needling 1-2 muscles (63208):  [] Dry Needling 3+ muscles (469918  [] Group:      [] Other:                    GOALS:  Patient stated goal: decreased pain   [x] Progressing: [] Met: [] Not Met: [] Adjusted     Therapist goals for Patient:   Short Term Goals: To be achieved in: 2 weeks  1. Independent in HEP and progression per patient tolerance, in order to prevent re-injury. [] Progressing: [x] Met: [] Not Met: [] Adjusted  2. Patient will have a decrease in pain to facilitate improvement in movement, function, and ADLs as indicated by Functional Deficits. [x] Progressing: [] Met: [] Not Met: [] Adjusted     Long Term Goals: To be achieved in: 8 weeks  1. FOTO score of at least 70 to assist with reaching prior level of function. [] Progressing: [x] Met: [] Not Met: [] Adjusted  2. Patient will demonstrate increased AROM to >170 to allow for proper joint functioning as indicated by patients Functional Deficits. [] Progressing: [x] Met: [] Not Met: [] Adjusted  3. Patient will demonstrate an increase in Gross RUE Strength to 4+/5 to allow for proper functional mobility as indicated by patients Functional Deficits. [x] Progressing: [] Met: [] Not Met: [] Adjusted  4. Patient will return to functional activities including lifting 5# weight overhead without increased symptoms or restriction to be able to carry groceries. [x] Progressing: [] Met: [] Not Met: [] Adjusted  5. Pt will report no pain or increased symptoms w/ self care activities such as dressing, hair care to return to PLOF. [] Progressing: [x] Met: [] Not Met: [] Adjusted         Overall Progression Towards Functional goals/ Treatment Progress Update:  [x] Patient is progressing as expected towards functional goals listed. [] Progression is slowed due to complexities/Impairments listed. [] Progression has been slowed due to co-morbidities.   [] Plan just implemented, too soon to assess goals progression <30days   [] Goals require adjustment due to lack of progress  [] Patient is not progressing as expected and requires additional follow up with physician  [] Other    Persisting Functional Limitations/Impairments:  []Sitting []Standing   []Transfers  []Sleeping   []Reaching [x]Lifting   []ADLs [x]Housework  []Driving []Job related tasks  []Sports/Recreation []Other:    ASSESSMENT: Pt tolerated tx session w/ no increase of pain. Progressions outlined in bold above focused on postural stability with UE movement. Education provided for alignment with mechanics to promote cervical AROM w/o compensations. Continue to strengthen periscapular musculature and increase postural awareness. Skilled therapy will work to improve ROM, strength, endurance, lifting tolerance, and reduce pain and N&T in order to improve QOL and return to PLOF. Continue to challenge pt w/ more advanced exercises as able to tolerate, including WB activities without DL. Discussed DN and provided info packet to address muscular restrictions, also provided patient with OOP costs as DN is not covered by insurance. Inboxed DN request to MD.     Treatment/Activity Tolerance:  [x] Pt able to complete treatment [] Patient limited by fatique  [] Patient limited by pain  [] Patient limited by other medical complications  [] Other:     Prognosis:  [x] Good [] Fair  [] Poor    Patient Requires Follow-up: [x] Yes  [] No    Return to Play:    [x]  N/A      PLAN: PT 1-2x / week for 6-8 weeks. [x] Continue per plan of care [] Alter current plan (see comments)  [] Plan of care initiated [] Hold pending MD visit [] Discharge    Electronically signed by: Brandyn Verduzco, PT, DPT, OMT-C, CSMS    Note: If patient does not return for scheduled/ recommended follow up visits, this note will serve as a discharge from care along with most recent update on progress.

## 2022-08-12 ENCOUNTER — APPOINTMENT (OUTPATIENT)
Dept: PHYSICAL THERAPY | Age: 32
End: 2022-08-12
Payer: COMMERCIAL

## 2022-08-16 ENCOUNTER — HOSPITAL ENCOUNTER (OUTPATIENT)
Dept: PHYSICAL THERAPY | Age: 32
Setting detail: THERAPIES SERIES
Discharge: HOME | End: 2022-08-16
Payer: COMMERCIAL

## 2022-08-16 PROCEDURE — 97110 THERAPEUTIC EXERCISES: CPT

## 2022-08-16 PROCEDURE — 97140 MANUAL THERAPY 1/> REGIONS: CPT

## 2022-08-16 PROCEDURE — 97112 NEUROMUSCULAR REEDUCATION: CPT

## 2022-08-16 NOTE — FLOWSHEET NOTE
[address]  Phone: [phone]   Fax: [phone]    Physical Therapy Treatment Note/ Progress Report:     Date:  2022    Patient Name:  Joselin Lyman    :  1990  MRN: [id number]  Restrictions/Precautions:    Medical/Treatment Diagnosis Information:  Diagnosis: H51.748T (ICD-10-CM) - Shoulder subluxation, right, sequela  Treatment Diagnosis: Decreased R shld AROM, strength, and impaired functional mobility. Decreased cervical AROM and radiating symptoms. Insurance/Certification information:   [place] (90/10 after deductible[place] needling not [place]  Physician Information:  Salud Smith MD    Plan of care signed (Y/N): [x]  Yes []  No     Date of Patient follow up with Physician: spine - TBD     Progress Report: []  Yes  [x]  No     Date Range for reporting period:  Beginnin22  PN: 22  Ending: TBD     Progress report due (10 Rx/or 30 days whichever is less): visit #19 or 92     Recertification due (POC duration/ or 90 days whichever is less): visit #16 or 22 (8 weeks)     Visit # Insurance Allowable Auth required? Date Range   11 [place] []  Yes  []  No [place]Y     Latex Allergy:  [x]NO      []YES  Preferred Language for Healthcare:   [x]English       []other:    Functional Scale:           Date assessed:  FOTO physical FS primary measure score = 59 ; risk adjusted = 52  22  FOTO physical FS primary measure score = 75     22       Pain level:  3/10 - neck    SUBJECTIVE: pt states she is doing well this date. Hasn't had much pain at all today. Reports hold on DN due to not bringing in cash for same day pay.      OBJECTIVE:   Observation:  - Excellent scapular retraction ability  Test measurements:      PROM AROM     L R L R   Cervical Flexion      45     Cervical Extension      60     Cervical Rotation   54 50   Cervical Side-bend   45 44   Shoulder Flexion    WNL WNL WNL   Shoulder Abduction    WNL WNL WNL   Shoulder External Rotation    WNL WNL WNL   Shoulder Internal Rotation    WNL T3 T3         8/2 Strength (0-5) Left Right   Shoulder Shrug (C4) 5 5   Shoulder Flex 4+ 4+   Shoulder Abd (C5) 4+ 4-   Shoulder ER 5 4+   Shoulder IR 5 4+   Biceps (C6) 4+ 4+   Triceps (C7) 5 5       RESTRICTIONS/PRECAUTIONS: R Shld Dislocations     Exercises/Interventions:   Therapeutic Exercise (13076)  Resistance / level Sets / Seconds  Reps Notes/Cues   UBE fwd/bwd  2' ea  7/20 - d/c NPV?     AAROM cervical rotation w/ towel     Doorway Pec stretch      Standing B ER w/ Band Orange  1 12 7/13 - ^ reps   Standing shld FLEX w/ chin tuck     3-way tap on wall     LPD     Anti rotation abduction - ant  lime  2 10ea  Cueing to stay in abduction    TB IR 7/13 - added   TB mid rows 7/13 - added   TB Ext 7/13 - added   Standing shld flex 7/13 - added   CC LPD   30# 2 15 7/20- added   CC Adduction/ eccentric abduction     Thoracic Ext stretch      TB - ER B lime 2 15    TB - Horz AB w/ palms up/down  1 15 each           Therapeutic Activities (42046)& Neuromuscular Re-ed (01631)       Postural education w/ mvmt mechanics      Chin tucks      Prone mid row     Prone shld ext/ Horiz Abd   Hold prone scaptions   7/20 upper/lower traps   Prone I's, Cueing to pinch scapulas together   ER w/ Scap retract    Shoulder tapspush up position 7/13 - progressed to floor    Prone Rhythmic stabilization   7/13 - added   Serratus punches 7/20-      Body blades - 3 ways at 90 deg flex  Yellow 1 30\" each R/L    Banded ER w/ elevation on wall w/ foam roller Orange  2 15 Cueing to maintain ER positioning   Open book stretches   B Cervical isometrics Flex/Ext/RSB/LSB     Dead bugs on foam roller   Cueing to keep spine neutral   's carry  7.5# KB  Cueing to pinch shoulder blades back    Farmer's carry  20# KB  Cueing to pinch shoulder blades back    Paloff press     B Landmine Press  2 10 ea    TB - I Y T Y I lime 1 5 Manual Intervention (10224)       Mid cervical manip  4'     Prone CT jx manip    4'     Supine thoracic manip   4'  Cavitation    Seated CT jx Manip       Cervical Soft tissue       Sub-occipital release      Trigger point release   5'  B upper trap    Cervical traction       PA thoracic mobs               Modalities: M  8/9 - sent DN request to Dr. Romi Thakkar via inbox message ; 8/16 - approved     Pt. Education:  -pt educated on diagnosis, prognosis and expectations for rehab  -all pt questions were answered    Home Exercise Program:  HEP instruction: Written HEP instructions provided and reviewed   Access Code: [email or web address]  URL: [email or web address]  Date: 07/01/2022  Prepared by: Sigrid Vora     Exercises  Supine Chin Tuck - 1 x daily - 7 x weekly - 10 reps - 5 hold  Shoulder External Rotation and Scapular Retraction with Resistance - 1 x daily - 7 x weekly - 3 sets - 10 reps  Prone Shoulder Row - 1 x daily - 7 x weekly - 3 sets - 10 reps  Prone Shoulder Extension - Single Arm - 1 x daily - 7 x weekly - 3 sets - 10 reps  Prone Scapular Slide with Shoulder Extension - 1 x daily - 7 x weekly - 3 sets - 10 reps - 5 hold    Therapeutic Exercise and NMR EXR  [x] (51848) Provided verbal/tactile cueing for activities related to strengthening, flexibility, endurance, ROM  for improvements in scapular, scapulothoracic and UE control with self care, reaching, carrying, lifting, house/yardwork, driving/computer work.     [x] (50887) Provided verbal/tactile cueing for activities related to improving balance, coordination, kinesthetic sense, posture, motor skill, proprioception  to assist with  scapular, scapulothoracic and UE control with self care, reaching, carrying, lifting, house/yardwork, driving/computer work.  [] (83502) Therapist is in constant attendance of 2 or more patients providing skilled therapy interventions, but not providing any significant amount of measurable one-on-one time to either patient, for improvements in cervical, scapular, scapulothoracic and UE control with self care, reaching, carrying, lifting, house/yardwork, driving, computer work. Therapeutic Activities:    [x] (36749 or 09255) Provided verbal/tactile cueing for activities related to improving balance, coordination, kinesthetic sense, posture, motor skill, proprioception and motor activation to allow for proper function of scapular, scapulothoracic and UE control with self care, carrying, lifting, driving/computer work.      Home Exercise Program:    [] (47967) Reviewed/Progressed HEP activities related to strengthening, flexibility, endurance, ROM of scapular, scapulothoracic and UE control with self care, reaching, carrying, lifting, house/yardwork, driving/computer work  [] (97145) Reviewed/Progressed HEP activities related to improving balance, coordination, kinesthetic sense, posture, motor skill, proprioception of scapular, scapulothoracic and UE control with self care, reaching, carrying, lifting, house/yardwork, driving/computer work      Manual Treatments:  PROM / STM / Oscillations-Mobs:  G-I, II, III, IV (PA's, Inf., Post.)  [] (10959) Provided manual therapy to mobilize soft tissue/joints of cervical/CT, scapular GHJ and UE for the purpose of modulating pain, promoting relaxation,  increasing ROM, reducing/eliminating soft tissue swelling/inflammation/restriction, improving soft tissue extensibility and allowing for proper ROM for normal function with self care, reaching, carrying, lifting, house/yardwork, driving/computer work    Charges:  Timed Code Treatment Minutes: 43   Total Treatment Minutes: 43     [] EVAL - LOW (17807)   [] EVAL - MOD (35330)  [] EVAL - HIGH (29398)  [] RE-EVAL (91288)  [x] CP(04464) x 1      [] Ionto  [x] NMR (64026) x  1     [] Vaso  [x] Manual (23949) x  1     [] Ultrasound  [] TA x 1     [] Mech Traction (59204)   [] Aquatic Therapy x      [] ES (un) (07566):   [] Home Management Training x  [] ES(attended) (95653)   [] Dry Needling 1-2 muscles (83395):  [] Dry Needling 3+ muscles (470840  [] Group:      [] Other:                    GOALS:  Patient stated goal: decreased pain   [x] Progressing: [] Met: [] Not Met: [] Adjusted     Therapist goals for Patient:   Short Term Goals: To be achieved in: 2 weeks  1. Independent in HEP and progression per patient tolerance, in order to prevent re-injury. [] Progressing: [x] Met: [] Not Met: [] Adjusted  2. Patient will have a decrease in pain to facilitate improvement in movement, function, and ADLs as indicated by Functional Deficits. [x] Progressing: [] Met: [] Not Met: [] Adjusted     Long Term Goals: To be achieved in: 8 weeks  1. FOTO score of at least 70 to assist with reaching prior level of function. [] Progressing: [x] Met: [] Not Met: [] Adjusted  2. Patient will demonstrate increased AROM to >170 to allow for proper joint functioning as indicated by patients Functional Deficits. [] Progressing: [x] Met: [] Not Met: [] Adjusted  3. Patient will demonstrate an increase in Gross RUE Strength to 4+/5 to allow for proper functional mobility as indicated by patients Functional Deficits. [x] Progressing: [] Met: [] Not Met: [] Adjusted  4. Patient will return to functional activities including lifting 5# weight overhead without increased symptoms or restriction to be able to carry groceries. [x] Progressing: [] Met: [] Not Met: [] Adjusted  5. Pt will report no pain or increased symptoms w/ self care activities such as dressing, hair care to return to PLOF. [] Progressing: [x] Met: [] Not Met: [] Adjusted         Overall Progression Towards Functional goals/ Treatment Progress Update:  [x] Patient is progressing as expected towards functional goals listed. [] Progression is slowed due to complexities/Impairments listed. [] Progression has been slowed due to co-morbidities.   [] Plan just implemented, too soon to assess goals progression <30days   [] Goals require adjustment due to lack of progress  [] Patient is not progressing as expected and requires additional follow up with physician  [] Other    Persisting Functional Limitations/Impairments:  []Sitting []Standing   []Transfers  []Sleeping   []Reaching [x]Lifting   []ADLs [x]Housework  []Driving []Job related tasks  []Sports/Recreation []Other:    ASSESSMENT: Pt tolerated tx session w/ no increase of pain. Progressions outlined in bold above focused on postural stability with UE movement. Education provided for alignment with mechanics to promote cervical AROM w/o compensations. Continue to strengthen periscapular musculature and increase postural awareness. Skilled therapy will work to improve ROM, strength, endurance, lifting tolerance, and reduce pain and N&T in order to improve QOL and return to PLOF. Continue to challenge pt w/ more advanced exercises as able to tolerate, including WB activities without DL. DN approved by MD, will perform at upcoming session per patient request when able to pay cash same day. Treatment/Activity Tolerance:  [x] Pt able to complete treatment [] Patient limited by fatique  [] Patient limited by pain  [] Patient limited by other medical complications  [] Other:     Prognosis:  [x] Good [] Fair  [] Poor    Patient Requires Follow-up: [x] Yes  [] No    Return to Play:    [x]  N/A      PLAN: PT 1-2x / week for 6-8 weeks. [x] Continue per plan of care [] Alter current plan (see comments)  [] Plan of care initiated [] Hold pending MD visit [] Discharge    Electronically signed by: Pepper Jessica, PT, DPT, OMT-C, CSMS    Note: If patient does not return for scheduled/ recommended follow up visits, this note will serve as a discharge from care along with most recent update on progress.

## 2022-08-24 ENCOUNTER — APPOINTMENT (OUTPATIENT)
Dept: PHYSICAL THERAPY | Age: 32
End: 2022-08-24
Payer: COMMERCIAL

## 2022-08-31 ENCOUNTER — APPOINTMENT (OUTPATIENT)
Dept: PHYSICAL THERAPY | Age: 32
End: 2022-08-31
Payer: COMMERCIAL

## 2023-05-25 NOTE — FLOWSHEET NOTE
[address]  Phone: [phone]   Fax: [phone]    Physical Therapy Treatment Note/ Progress Report:     Date:  2022    Patient Name:  Dl Berry    :  1990  MRN: [id number]  Restrictions/Precautions:    Medical/Treatment Diagnosis Information:  Diagnosis: H27.114W (ICD-10-CM) - Shoulder subluxation, right, sequela  Treatment Diagnosis: Decreased R shld AROM, strength, and impaired functional mobility. Decreased cervical AROM and radiating symptoms. Insurance/Certification information:   [employer] (90/10 after deductible)- [place]; Dry needling not covered  Physician Information:  Manjinder Benavidez MD    Plan of care signed (Y/N): [x]  Yes []  No     Date of Patient follow up with Physician: spine - TBD     Progress Report: []  Yes  [x]  No     Date Range for reporting period:  Beginnin22  Ending: TBD     Progress report due (10 Rx/or 30 days whichever is less): visit #10 or      Recertification due (POC duration/ or 90 days whichever is less): visit #16 or 22 (8 weeks)     Visit # Insurance Allowable Auth required? Date Range   8 [place] []  Yes  []  No PCY     Latex Allergy:  [x]NO      []YES  Preferred Language for Healthcare:   [x]English       []other:    Functional Scale:           Date assessed:  FOTO physical FS primary measure score = 59 ; risk adjusted = 52  22     Pain level:  310 - neck    SUBJECTIVE: Pt reports that she has been feeling pretty good. Her neck has been sore, but has noticed that her shoulders are more even. Pt reports numbness and tingling down both arms this past weekend. OBJECTIVE:   Observation:  - Excellent scapular retraction ability  Test measurements:      RESTRICTIONS/PRECAUTIONS: R Shld Dislocations     Exercises/Interventions:   Therapeutic Exercise (30215)  Resistance / level Sets / Seconds  Reps Notes/Cues   UBE fwd/bwd  2' ea   - d/c NPV?     Doorway Pec stretch Chief complaint:   Patient is a 71y old  Female who presents with a chief complaint of ICH (24 May 2023 12:06)    HPI:  71 year old female found down on her front lawn today by her boss who was coming to pick her up for work. Last known well was last night. Pt presented to [facility] unresponsive and was found to have large right ICH, intubated and transferred to [facility] for neurosurgical consultation. ICH 3, NIH 33, mrs 0     (24 May 2023 10:40)        24hr EVENTS:      ROS: [ ]  Unable to assess due to mental status   All other systems negative    -----------------------------------------------------------------------------------------------------------------------------------------------------------------------------------  ICU Vital Signs Last 24 Hrs  T(C): 38 (25 May 2023 08:45), Max: 39.2 (25 May 2023 08:00)  T(F): 100.4 (25 May 2023 08:45), Max: 102.6 (25 May 2023 08:00)  HR: 119 (25 May 2023 08:45) (64 - 141)  BP: 133/75 (25 May 2023 08:45) (66/44 - 180/81)  BP(mean): 90 (25 May 2023 08:45) (52 - 121)  ABP: --  ABP(mean): --  RR: 14 (25 May 2023 08:45) (12 - 25)  SpO2: 100% (25 May 2023 08:45) (99% - 100%)    O2 Parameters below as of 24 May 2023 18:00  Patient On (Oxygen Delivery Method): ventilator  O2 Flow (L/min): 40          I&O's Summary    24 May 2023 07:01  -  25 May 2023 07:00  --------------------------------------------------------  IN: 5534.9 mL / OUT: 3790 mL / NET: 1744.9 mL    25 May 2023 07:01  -  25 May 2023 08:57  --------------------------------------------------------  IN: 75 mL / OUT: 0 mL / NET: 75 mL        MEDICATIONS  (STANDING):  chlorhexidine 0.12% Liquid 15 milliLiter(s) Oral Mucosa every 12 hours  chlorhexidine 2% Cloths 1 Application(s) Topical daily  dextrose 50% Injectable 25 Gram(s) IV Push once  dextrose 50% Injectable 12.5 Gram(s) IV Push once  dextrose 50% Injectable 25 Gram(s) IV Push once  insulin lispro (ADMELOG) corrective regimen sliding scale   SubCutaneous every 6 hours  levETIRAcetam  IVPB 500 milliGRAM(s) IV Intermittent every 12 hours  niCARdipine Infusion 5 mG/Hr (25 mL/Hr) IV Continuous <Continuous>  norepinephrine Infusion 0.05 MICROgram(s)/kG/Min (6.06 mL/Hr) IV Continuous <Continuous>  pantoprazole  Injectable 40 milliGRAM(s) IV Push daily  phenylephrine    Infusion 0.1 MICROgram(s)/kG/Min (2.42 mL/Hr) IV Continuous <Continuous>  potassium chloride  10 mEq/100 mL IVPB 10 milliEquivalent(s) IV Intermittent every 1 hour  propofol Infusion 10 MICROgram(s)/kG/Min (3.88 mL/Hr) IV Continuous <Continuous>  sodium chloride 0.225%. 1000 milliLiter(s) (75 mL/Hr) IV Continuous <Continuous>  sodium chloride 0.9% Bolus 1000 milliLiter(s) IV Bolus once      RESPIRATORY:  Mode: AC/ CMV (Assist Control/ Continuous Mandatory Ventilation)  RR (machine): 12  TV (machine): 450  FiO2: 40  PEEP: 6  ITime: 1  MAP: 8  PIP: 19        IMAGING:   Recent imaging studies were reviewed.    LAB RESULTS:                          14.2   13.70 )-----------( 198      ( 25 May 2023 02:47 )             41.4           05-25    151<H>  |  118<H>  |  10.6  ----------------------------<  117<H>  3.8   |  21.0<L>  |  0.47<L>    Ca    8.6      25 May 2023 02:47  Phos  2.8     05-25  Mg     1.9     05-25            ABG - ( 25 May 2023 05:39 )  pH, Arterial: 7.520 pH, Blood: x     /  pCO2: 28    /  pO2: 176   / HCO3: 23    / Base Excess: 0.0   /  SaO2: 100.0                 -----------------------------------------------------------------------------------------------------------------------------------------------------------------------------------    PHYSICAL EXAM:  General: Calm, intubated  HEENT: MMM  Neuro:  -Mental status- No acute distress  -CN- PERRL 3mm, EOMI, tongue midline, face symmetric    CV: RRR  Pulm: clear to auscultation  Abd: Soft, nontender, nondistended  Ext: no noted edema in lower ext  Skin: warm, dry       Standing ER w/ Band [place]  1 12 7/13 - ^ reps   Standing shld FLEX w/ chin tuck     3-way tap on wall  [place]  1 10 ea    LPD     Anti rotation abduction - ant  Cueing to stay in abduction    TB IR [place]  2 15 7/13 - added   TB mid rows 7/13 - added   TB Ext 7/13 - added   Standing shld flex 7/13 - added   CC LPD   dispensed wine TB 7/20- added   CC Adduction     Thoracic Ext stretch   20\"  3     Therapeutic Activities (69220)       Pt Education    See eval                                Neuromuscular Re-ed (93204)       Chin tucks      Prone mid row     Prone shld ext/ Horiz Abd   Hold prone scaptions   7/20 upper/lower traps   Prone I's, Y's T's GSB 2 10ea  Cueing to pinch scapulas together   ER w/ Scap retract    Shoulder tapspush up position  2 10 7/13 - progressed to floor    Prone Rhythmic stabilization   7/13 - added   Serratus punches 7/20-      Banded ER w/ elevation on wall w/ foam roller [place]  2 15 Cueing to maintain ER positioning   Open book stretches   B Cervical isometrics Flex/Ext/RSB/LSB  5\"  10ea     Dead bugs on foam roller   1 10 ea Cueing to keep spine neutral   's carry  7.5# KB  1 lap ea   Cueing to pinch shoulder blades back    Farmer's carry  20# KB  1 lap ea   Cueing to pinch shoulder blades back           Manual Intervention (45161)       Prone CT jx manip       Supine thoracic manip    Cavitation    Seated CT jx Manip       Cervical Soft tissue       Sub-occipital release   4'     Trigger point release   5'  B upper trap    Cervical traction       PA thoracic mobs  4'               Modalities: Mechanical cervical traction 10 min 20 lbs max; 12 lbs min; 30s/10s, 30% speed ; automatic stop handed to patient     Pt. Education:  -pt educated on diagnosis, prognosis and expectations for rehab  -all pt questions were answered    Home Exercise Program:  HEP instruction: Written HEP instructions provided and reviewed   Access Code: [email or web address]  URL: [email or web address]/  Date: 07/01/2022  Prepared by: Joce Miller     Exercises  Supine Chin Tuck - 1 x daily - 7 x weekly - 10 reps - 5 hold  Shoulder External Rotation and Scapular Retraction with Resistance - 1 x daily - 7 x weekly - 3 sets - 10 reps  Prone Shoulder Row - 1 x daily - 7 x weekly - 3 sets - 10 reps  Prone Shoulder Extension - Single Arm - 1 x daily - 7 x weekly - 3 sets - 10 reps  Prone Scapular Slide with Shoulder Extension - 1 x daily - 7 x weekly - 3 sets - 10 reps - 5 hold    Therapeutic Exercise and NMR EXR  [x] (57716) Provided verbal/tactile cueing for activities related to strengthening, flexibility, endurance, ROM  for improvements in scapular, scapulothoracic and UE control with self care, reaching, carrying, lifting, house/yardwork, driving/computer work. [x] (65076) Provided verbal/tactile cueing for activities related to improving balance, coordination, kinesthetic sense, posture, motor skill, proprioception  to assist with  scapular, scapulothoracic and UE control with self care, reaching, carrying, lifting, house/yardwork, driving/computer work.  [] (49416) Therapist is in constant attendance of 2 or more patients providing skilled therapy interventions, but not providing any significant amount of measurable one-on-one time to either patient, for improvements in cervical, scapular, scapulothoracic and UE control with self care, reaching, carrying, lifting, house/yardwork, driving, computer work. Therapeutic Activities:    [x] (35089 or 38072) Provided verbal/tactile cueing for activities related to improving balance, coordination, kinesthetic sense, posture, motor skill, proprioception and motor activation to allow for proper function of scapular, scapulothoracic and UE control with self care, carrying, lifting, driving/computer work.      Home Exercise Program:    [] (41457) Reviewed/Progressed HEP activities related to strengthening, flexibility, endurance, ROM of scapular, scapulothoracic and UE control with self care, reaching, carrying, lifting, house/yardwork, driving/computer work  [] (79134) Reviewed/Progressed HEP activities related to improving balance, coordination, kinesthetic sense, posture, motor skill, proprioception of scapular, scapulothoracic and UE control with self care, reaching, carrying, lifting, house/yardwork, driving/computer work      Manual Treatments:  PROM / STM / Oscillations-Mobs:  G-I, II, III, IV (PA's, Inf., Post.)  [] ([id number] [place] [address]) Provided manual therapy to mobilize soft tissue/joints of cervical/CT, scapular GHJ and UE for the purpose of modulating pain, promoting relaxation,  increasing ROM, reducing/eliminating soft tissue swelling/inflammation/restriction, improving soft tissue extensibility and allowing for proper ROM for normal function with self care, reaching, carrying, lifting, house/yardwork, driving/computer work    Charges:  Timed Code Treatment Minutes: 44   Total Treatment Minutes: 54     [] EVAL - LOW (89657)   [] EVAL - MOD (81698)  [] EVAL - HIGH (30789)  [] RE-EVAL (55091)  [] UT(28306) x 1      [] Ionto  [x] NMR (95284) x 2      [] Vaso  [x] Manual ([address] [facility]) x  1     [] Ultrasound  [] TA x      [x] Mech Traction (95514)   [] Aquatic Therapy x      [] ES (un) (07599):   [] Home Management Training x  [] ES(attended) (53752)   [] Dry Needling 1-2 muscles (01125):  [] Dry Needling 3+ muscles (855937  [] Group:      [] Other:                    GOALS:  Patient stated goal: decreased pain   [x] Progressing: [] Met: [] Not Met: [] Adjusted     Therapist goals for Patient:   Short Term Goals: To be achieved in: 2 weeks  1. Independent in HEP and progression per patient tolerance, in order to prevent re-injury. [x] Progressing: [] Met: [] Not Met: [] Adjusted  2. Patient will have a decrease in pain to facilitate improvement in movement, function, and ADLs as indicated by Functional Deficits. [x] Progressing: [] Met: [] Not Met: [] Adjusted     Long Term Goals:  To be achieved in: 8 weeks  1. FOTO score of at least 70 to assist with reaching prior level of function. [] Progressing: [] Met: [] Not Met: [] Adjusted  2. Patient will demonstrate increased AROM to >170 to allow for proper joint functioning as indicated by patients Functional Deficits. [] Progressing: [] Met: [] Not Met: [] Adjusted  3. Patient will demonstrate an increase in Gross RUE Strength to 4+/5 to allow for proper functional mobility as indicated by patients Functional Deficits. [] Progressing: [] Met: [] Not Met: [] Adjusted  4. Patient will return to functional activities including lifting 5# weight overhead without increased symptoms or restriction to be able to carry groceries. [] Progressing: [] Met: [] Not Met: [] Adjusted  5. Pt will report no pain or increased symptoms w/ self care activities such as dressing, hair care to return to PLOF. [] Progressing: [] Met: [] Not Met: [] Adjusted         Overall Progression Towards Functional goals/ Treatment Progress Update:  [] Patient is progressing as expected towards functional goals listed. [] Progression is slowed due to complexities/Impairments listed. [] Progression has been slowed due to co-morbidities. [x] Plan just implemented, too soon to assess goals progression <30days   [] Goals require adjustment due to lack of progress  [] Patient is not progressing as expected and requires additional follow up with physician  [] Other    Persisting Functional Limitations/Impairments:  []Sitting []Standing   []Transfers  []Sleeping   []Reaching []Lifting   []ADLs []Housework  []Driving []Job related tasks  []Sports/Recreation []Other:    ASSESSMENT: Pt tolerated tx session w/ no increase of pain. Mechanical cervical traction trialed per MD request to calm paresthesia symptoms and reported no change in symptoms or worsening in symptoms today. Pt had increased endurance this session w/ increased repetitions.  Continue to strengthen periscapular musculature and increase postural awareness. Skilled therapy will work to improve ROM, strength, endurance, lifting tolerance, and reduce pain and N&T in order to improve QOL and return to PLOF. Continue to challenge pt w/ more advanced exercises as able to tolerate, including WB activities without DL. Treatment/Activity Tolerance:  [x] Pt able to complete treatment [] Patient limited by fatique  [] Patient limited by pain  [] Patient limited by other medical complications  [] Other:     Prognosis:  [x] Good [] Fair  [] Poor    Patient Requires Follow-up: [x] Yes  [] No    Return to Play:    [x]  N/A      PLAN: PT 1-2x / week for 6-8 weeks. [x] Continue per plan of care [] Alter current plan (see comments)  [] Plan of care initiated [] Hold pending MD visit [] Discharge    Electronically signed by: Rylan Sandoval, PT, DPT, OMT-C     Therapist was present, directed the patient's care, made skilled judgement, and was responsible for assessment and treatment of the patient. Abdi Son, SPT    Note: If patient does not return for scheduled/ recommended follow up visits, this note will serve as a discharge from care along with most recent update on progress. Chief complaint:   Patient is a 71y old  Female who presents with a chief complaint of ICH (24 May 2023 12:06)    HPI:  71 year old female found down on her front lawn today by her boss who was coming to pick her up for work. Last known well was last night. Pt presented to [facility] unresponsive and was found to have large right ICH, intubated and transferred to [facility] for neurosurgical consultation. ICH 3, NIH 33, mrs 0     (24 May 2023 10:40)    24hr EVENTS:  presented with acute ICH    ROS: [x ]  Unable to assess due to mental status   All other systems negative    -----------------------------------------------------------------------------------------------------------------------------------------------------------------------------------  ICU Vital Signs Last 24 Hrs  T(C): 38 (25 May 2023 08:45), Max: 39.2 (25 May 2023 08:00)  T(F): 100.4 (25 May 2023 08:45), Max: 102.6 (25 May 2023 08:00)  HR: 119 (25 May 2023 08:45) (64 - 141)  BP: 133/75 (25 May 2023 08:45) (66/44 - 180/81)  BP(mean): 90 (25 May 2023 08:45) (52 - 121)  ABP: --  ABP(mean): --  RR: 14 (25 May 2023 08:45) (12 - 25)  SpO2: 100% (25 May 2023 08:45) (99% - 100%)    O2 Parameters below as of 24 May 2023 18:00  Patient On (Oxygen Delivery Method): ventilator  O2 Flow (L/min): 40          I&O's Summary    24 May 2023 07:01  -  25 May 2023 07:00  --------------------------------------------------------  IN: 5534.9 mL / OUT: 3790 mL / NET: 1744.9 mL    25 May 2023 07:01  -  25 May 2023 08:57  --------------------------------------------------------  IN: 75 mL / OUT: 0 mL / NET: 75 mL        MEDICATIONS  (STANDING):  chlorhexidine 0.12% Liquid 15 milliLiter(s) Oral Mucosa every 12 hours  chlorhexidine 2% Cloths 1 Application(s) Topical daily  dextrose 50% Injectable 25 Gram(s) IV Push once  dextrose 50% Injectable 12.5 Gram(s) IV Push once  dextrose 50% Injectable 25 Gram(s) IV Push once  insulin lispro (ADMELOG) corrective regimen sliding scale   SubCutaneous every 6 hours  levETIRAcetam  IVPB 500 milliGRAM(s) IV Intermittent every 12 hours  niCARdipine Infusion 5 mG/Hr (25 mL/Hr) IV Continuous <Continuous>  norepinephrine Infusion 0.05 MICROgram(s)/kG/Min (6.06 mL/Hr) IV Continuous <Continuous>  pantoprazole  Injectable 40 milliGRAM(s) IV Push daily  phenylephrine    Infusion 0.1 MICROgram(s)/kG/Min (2.42 mL/Hr) IV Continuous <Continuous>  potassium chloride  10 mEq/100 mL IVPB 10 milliEquivalent(s) IV Intermittent every 1 hour  propofol Infusion 10 MICROgram(s)/kG/Min (3.88 mL/Hr) IV Continuous <Continuous>  sodium chloride 0.225%. 1000 milliLiter(s) (75 mL/Hr) IV Continuous <Continuous>  sodium chloride 0.9% Bolus 1000 milliLiter(s) IV Bolus once      RESPIRATORY:  Mode: AC/ CMV (Assist Control/ Continuous Mandatory Ventilation)  RR (machine): 12  TV (machine): 450  FiO2: 40  PEEP: 6  ITime: 1  MAP: 8  PIP: 19        IMAGING:   Recent imaging studies were reviewed.    LAB RESULTS:                          14.2   13.70 )-----------( 198      ( 25 May 2023 02:47 )             41.4           05-25    151<H>  |  118<H>  |  10.6  ----------------------------<  117<H>  3.8   |  21.0<L>  |  0.47<L>    Ca    8.6      25 May 2023 02:47  Phos  2.8     05-25  Mg     1.9     05-25            ABG - ( 25 May 2023 05:39 )  pH, Arterial: 7.520 pH, Blood: x     /  pCO2: 28    /  pO2: 176   / HCO3: 23    / Base Excess: 0.0   /  SaO2: 100.0       -----------------------------------------------------------------------------------------------------------------------------------------------------------------------------------    PHYSICAL EXAM:  General: Calm, intubated  HEENT: MMM  Neuro:  -Mental status- No acute distress  -CN- Pupils fixed 5mm, tongue midline, face symmetric  no cough/gag/corneals  flaccid bilat upper ext  weak TF in bilat lower ext    CV: RRR  Pulm: clear to auscultation  Abd: Soft, nontender, nondistended  Ext: no noted edema in lower ext  Skin: warm, dry

## 2024-07-26 ENCOUNTER — TELEPHONE (OUTPATIENT)
Dept: INTERNAL MEDICINE CLINIC | Age: 34
End: 2024-07-26

## 2024-07-26 NOTE — TELEPHONE ENCOUNTER
Pt was getting Lexapro from her gynecologist for several years and that practice has totally closed. She is asking if  can take over that rx for her - she has about one week left.    She is very overdue for a visit - last seen 6/2022.    [facility] and is going to check with that insurance to be sure  is in network. She is aware that she needs to schedule an appt.

## 2024-07-26 NOTE — TELEPHONE ENCOUNTER
I suggest she check with her gynecology office for a 1 month refill on the Lexapro.    They are still practicing and if she has been in to see them in the past 2 years, you are still their patient.  If she has not been seen for several years, and they refuse to fill,  I will consider giving her a few  weeks until we find out if she can come here as a patient.      If we do take her insurance, get her scheduled for a visit --- hopefully in the next month.      We can check with someone up front to see if we take this Medicaid insurance.   I think we are on this insurance per quick search of the [facility] Medicaid site.

## 2024-07-26 NOTE — TELEPHONE ENCOUNTER
Patient informed. She states that she last saw Paula Cloud CNP April of 2023. I provided the number that I found online. Patient states that she will give them a call to see if they will give her a month supply to make it through to her appointment next month.     The office does accept her insurance.

## 2024-08-15 ENCOUNTER — OFFICE VISIT (OUTPATIENT)
Dept: INTERNAL MEDICINE CLINIC | Age: 34
End: 2024-08-15

## 2024-08-15 VITALS
HEIGHT: 66 IN | DIASTOLIC BLOOD PRESSURE: 70 MMHG | SYSTOLIC BLOOD PRESSURE: 100 MMHG | WEIGHT: 133 LBS | BODY MASS INDEX: 21.38 KG/M2 | HEART RATE: 80 BPM

## 2024-08-15 DIAGNOSIS — S83.421A SPRAIN OF LATERAL COLLATERAL LIGAMENT OF RIGHT KNEE, INITIAL ENCOUNTER: ICD-10-CM

## 2024-08-15 DIAGNOSIS — M79.10 MYALGIA: ICD-10-CM

## 2024-08-15 DIAGNOSIS — M25.50 ARTHRALGIA, UNSPECIFIED JOINT: ICD-10-CM

## 2024-08-15 DIAGNOSIS — Z00.00 PREVENTATIVE HEALTH CARE: Primary | ICD-10-CM

## 2024-08-15 DIAGNOSIS — Q79.60 EHLERS-DANLOS SYNDROME: ICD-10-CM

## 2024-08-15 DIAGNOSIS — Z00.00 PREVENTATIVE HEALTH CARE: ICD-10-CM

## 2024-08-15 LAB
25(OH)D3 SERPL-MCNC: 40.9 NG/ML
ALBUMIN SERPL-MCNC: 4.8 G/DL (ref 3.4–5)
ALBUMIN/GLOB SERPL: 2.1 {RATIO} (ref 1.1–2.2)
ALP SERPL-CCNC: 68 U/L (ref 40–129)
ALT SERPL-CCNC: 13 U/L (ref 10–40)
ANION GAP SERPL CALCULATED.3IONS-SCNC: 11 MMOL/L (ref 3–16)
AST SERPL-CCNC: 27 U/L (ref 15–37)
BILIRUB SERPL-MCNC: 0.3 MG/DL (ref 0–1)
BUN SERPL-MCNC: 12 MG/DL (ref 7–20)
CALCIUM SERPL-MCNC: 9.8 MG/DL (ref 8.3–10.6)
CHLORIDE SERPL-SCNC: 101 MMOL/L (ref 99–110)
CHOLEST SERPL-MCNC: 160 MG/DL (ref 0–199)
CO2 SERPL-SCNC: 27 MMOL/L (ref 21–32)
CREAT SERPL-MCNC: 0.8 MG/DL (ref 0.6–1.1)
GFR SERPLBLD CREATININE-BSD FMLA CKD-EPI: >90 ML/MIN/{1.73_M2}
GLUCOSE SERPL-MCNC: 93 MG/DL (ref 70–99)
HDLC SERPL-MCNC: 74 MG/DL (ref 40–60)
LDLC SERPL CALC-MCNC: 69 MG/DL
POTASSIUM SERPL-SCNC: 4.2 MMOL/L (ref 3.5–5.1)
PROT SERPL-MCNC: 7.1 G/DL (ref 6.4–8.2)
SODIUM SERPL-SCNC: 139 MMOL/L (ref 136–145)
TRIGL SERPL-MCNC: 85 MG/DL (ref 0–150)
TSH SERPL DL<=0.005 MIU/L-ACNC: 0.93 UIU/ML (ref 0.27–4.2)
VLDLC SERPL CALC-MCNC: 17 MG/DL

## 2024-08-15 SDOH — ECONOMIC STABILITY: FOOD INSECURITY: WITHIN THE PAST 12 MONTHS, THE FOOD YOU BOUGHT JUST DIDN'T LAST AND YOU DIDN'T HAVE MONEY TO GET MORE.: NEVER TRUE

## 2024-08-15 SDOH — ECONOMIC STABILITY: FOOD INSECURITY: WITHIN THE PAST 12 MONTHS, YOU WORRIED THAT YOUR FOOD WOULD RUN OUT BEFORE YOU GOT MONEY TO BUY MORE.: NEVER TRUE

## 2024-08-15 SDOH — ECONOMIC STABILITY: INCOME INSECURITY: HOW HARD IS IT FOR YOU TO PAY FOR THE VERY BASICS LIKE FOOD, HOUSING, MEDICAL CARE, AND HEATING?: NOT HARD AT ALL

## 2024-08-15 ASSESSMENT — PATIENT HEALTH QUESTIONNAIRE - PHQ9
SUM OF ALL RESPONSES TO PHQ QUESTIONS 1-9: 13
SUM OF ALL RESPONSES TO PHQ QUESTIONS 1-9: 13
5. POOR APPETITE OR OVEREATING: SEVERAL DAYS
SUM OF ALL RESPONSES TO PHQ QUESTIONS 1-9: 13
SUM OF ALL RESPONSES TO PHQ QUESTIONS 1-9: 13
2. FEELING DOWN, DEPRESSED OR HOPELESS: MORE THAN HALF THE DAYS
4. FEELING TIRED OR HAVING LITTLE ENERGY: NEARLY EVERY DAY
3. TROUBLE FALLING OR STAYING ASLEEP: MORE THAN HALF THE DAYS
6. FEELING BAD ABOUT YOURSELF - OR THAT YOU ARE A FAILURE OR HAVE LET YOURSELF OR YOUR FAMILY DOWN: MORE THAN HALF THE DAYS
1. LITTLE INTEREST OR PLEASURE IN DOING THINGS: NOT AT ALL
8. MOVING OR SPEAKING SO SLOWLY THAT OTHER PEOPLE COULD HAVE NOTICED. OR THE OPPOSITE, BEING SO FIGETY OR RESTLESS THAT YOU HAVE BEEN MOVING AROUND A LOT MORE THAN USUAL: MORE THAN HALF THE DAYS
9. THOUGHTS THAT YOU WOULD BE BETTER OFF DEAD, OR OF HURTING YOURSELF: NOT AT ALL
SUM OF ALL RESPONSES TO PHQ9 QUESTIONS 1 & 2: 2
7. TROUBLE CONCENTRATING ON THINGS, SUCH AS READING THE NEWSPAPER OR WATCHING TELEVISION: SEVERAL DAYS
10. IF YOU CHECKED OFF ANY PROBLEMS, HOW DIFFICULT HAVE THESE PROBLEMS MADE IT FOR YOU TO DO YOUR WORK, TAKE CARE OF THINGS AT HOME, OR GET ALONG WITH OTHER PEOPLE: SOMEWHAT DIFFICULT

## 2024-08-15 ASSESSMENT — COLUMBIA-SUICIDE SEVERITY RATING SCALE - C-SSRS
1. WITHIN THE PAST MONTH, HAVE YOU WISHED YOU WERE DEAD OR WISHED YOU COULD GO TO SLEEP AND NOT WAKE UP?: NO
6. HAVE YOU EVER DONE ANYTHING, STARTED TO DO ANYTHING, OR PREPARED TO DO ANYTHING TO END YOUR LIFE?: NO
2. HAVE YOU ACTUALLY HAD ANY THOUGHTS OF KILLING YOURSELF?: NO

## 2024-08-15 NOTE — PROGRESS NOTES
Mental Status: She is alert and oriented to person, place, and time.      Motor: No tremor or abnormal muscle tone.      Coordination: Coordination normal.      Gait: Gait normal.   Psychiatric:         Speech: Speech normal.         Behavior: Behavior normal.           An electronic signature was used to authenticate this note.    Monika Matos MD

## 2024-08-16 LAB
BASOPHILS # BLD: 0 K/UL (ref 0–0.2)
BASOPHILS NFR BLD: 0.8 %
DEPRECATED RDW RBC AUTO: 13.4 % (ref 12.4–15.4)
EOSINOPHIL # BLD: 0.1 K/UL (ref 0–0.6)
EOSINOPHIL NFR BLD: 1.3 %
HCT VFR BLD AUTO: 38.8 % (ref 36–48)
HGB BLD-MCNC: 13.4 G/DL (ref 12–16)
LYMPHOCYTES # BLD: 1.4 K/UL (ref 1–5.1)
LYMPHOCYTES NFR BLD: 23 %
MCH RBC QN AUTO: 31.9 PG (ref 26–34)
MCHC RBC AUTO-ENTMCNC: 34.7 G/DL (ref 31–36)
MCV RBC AUTO: 92 FL (ref 80–100)
MONOCYTES # BLD: 0.5 K/UL (ref 0–1.3)
MONOCYTES NFR BLD: 7.7 %
NEUTROPHILS # BLD: 4.1 K/UL (ref 1.7–7.7)
NEUTROPHILS NFR BLD: 67.2 %
PLATELET # BLD AUTO: 215 K/UL (ref 135–450)
PMV BLD AUTO: 10.3 FL (ref 5–10.5)
RBC # BLD AUTO: 4.21 M/UL (ref 4–5.2)
WBC # BLD AUTO: 6.1 K/UL (ref 4–11)

## 2024-08-21 ENCOUNTER — TELEPHONE (OUTPATIENT)
Dept: INTERNAL MEDICINE CLINIC | Age: 34
End: 2024-08-21

## 2024-08-21 NOTE — TELEPHONE ENCOUNTER
The rheumatologist from [facility] that is an EDS specialist is not accepting new patients.    It is recommended for you to see a  to help confirm the Hazel Danlos.  You should try to find out as much information about your relatives health conditions as you can.    One of the local rheumatologist basically said the treatment is for pain related to joint issues and if you have persistent pain, then I can have you see other rheumatologist. .     [facility] ASU (Adult):

## 2025-02-14 ENCOUNTER — OFFICE VISIT (OUTPATIENT)
Dept: INTERNAL MEDICINE CLINIC | Age: 35
End: 2025-02-14
Payer: MEDICAID

## 2025-02-14 VITALS
OXYGEN SATURATION: 99 % | HEIGHT: 66 IN | SYSTOLIC BLOOD PRESSURE: 120 MMHG | BODY MASS INDEX: 23.63 KG/M2 | DIASTOLIC BLOOD PRESSURE: 80 MMHG | HEART RATE: 90 BPM | WEIGHT: 147 LBS

## 2025-02-14 DIAGNOSIS — F43.29 STRESS AND ADJUSTMENT REACTION: ICD-10-CM

## 2025-02-14 DIAGNOSIS — L20.84 INTRINSIC ATOPIC DERMATITIS: Primary | ICD-10-CM

## 2025-02-14 PROCEDURE — 99213 OFFICE O/P EST LOW 20 MIN: CPT | Performed by: FAMILY MEDICINE

## 2025-02-14 RX ORDER — HYDROXYZINE HYDROCHLORIDE 25 MG/1
12.5-25 TABLET, FILM COATED ORAL EVERY 8 HOURS PRN
Qty: 60 TABLET | Refills: 1 | Status: SHIPPED | OUTPATIENT
Start: 2025-02-14

## 2025-02-14 RX ORDER — TRIAMCINOLONE ACETONIDE 1 MG/G
CREAM TOPICAL
Qty: 80 G | Refills: 1 | Status: SHIPPED | OUTPATIENT
Start: 2025-02-14

## 2025-02-14 SDOH — ECONOMIC STABILITY: FOOD INSECURITY: WITHIN THE PAST 12 MONTHS, YOU WORRIED THAT YOUR FOOD WOULD RUN OUT BEFORE YOU GOT MONEY TO BUY MORE.: NEVER TRUE

## 2025-02-14 SDOH — ECONOMIC STABILITY: FOOD INSECURITY: WITHIN THE PAST 12 MONTHS, THE FOOD YOU BOUGHT JUST DIDN'T LAST AND YOU DIDN'T HAVE MONEY TO GET MORE.: NEVER TRUE

## 2025-02-14 NOTE — PATIENT INSTRUCTIONS
You may need to change all soaps and laundry detergents:  Perfume free and dye free products.      Shower and bath soaps such as Aveeno Sensitive,  Melvin's Baby Sensitive, Oil of Olay and Dove.  Unscented if possible.    For moisturizers:  The creams in a white jar are good.  Examples are Cetaphil, Cerave, Vanicream, Aquaphor.    Add the prescription steroid cream into the jar and stir really well.  Apply twice a day to the itchy areas.  .    Keep bath or shower water warm and not steamy hot.  Soaking in a bathtub with oil added or oatmeal bath added is helpful.  (watch you do not slip getting out because tub can be slippery.)    During the Cold Winter months and early Spring when the temperatures are widely fluctuating: A humidifier or vaporizer running in the bedroom overnight and maybe also in a main room, even if you have one built into the furnace.

## 2025-02-14 NOTE — PROGRESS NOTES
reviewed.   Constitutional:       Appearance: She is well-developed.   Cardiovascular:      Rate and Rhythm: Normal rate and regular rhythm.      Heart sounds: Normal heart sounds.   Pulmonary:      Effort: Pulmonary effort is normal.      Breath sounds: Normal breath sounds.   Skin:     General: Skin is warm and dry.      Coloration: Skin is not pale.      Findings: Rash present. No erythema. Rash is urticarial.      Comments: Rash is located on back, chest, abdomen, arms, neck.  Spared are legs, face, palms, feet   Psychiatric:         Behavior: Behavior normal.

## 2025-02-18 ENCOUNTER — TELEMEDICINE (OUTPATIENT)
Age: 35
End: 2025-02-18

## 2025-02-18 DIAGNOSIS — F33.1 MAJOR DEPRESSIVE DISORDER, RECURRENT, MODERATE (HCC): Primary | ICD-10-CM

## 2025-02-18 DIAGNOSIS — F41.9 ANXIETY: ICD-10-CM

## 2025-02-18 NOTE — PROGRESS NOTES
Behavioral Health Consultation  Dayna Brown, Ph.D.  Clinical Psychologist  2/18/2025  3:00 PM  Name: Debo Braun  YOB: 1990  PCP: Monika Matos MD     This is her first [facility] appointment.  Reason for Consult:  Stress    The patient provided informed consent for the behavioral health services. Discussed model of service to include the limits of confidentiality (i.e. abuse reporting, suicide intervention, etc.) and short-term intervention focused approach. Patient indicated understanding.     S:  Debo is a 34 y.o. female seen per Monika Matos MD addressing stress, adjustment reaction. Longstanding anxiety since childhood. Onset of depression with SI in her teens, episodic since that time and recently exacerbated by stress of seeking a divorce, parenting 3 young boys (oldest age 8 has \"severe complex adhd,\" youngest age 3 is showing similar sxs). She was hoping for a civil divorce/disillusion,  refusing to complete disillusion paperwork.     Current depressed mood, nervousness, fatigue, irritability, tearful, diminished concentration, reduced appetite, excessive guilt, restlessness/on edge, muscle tension, difficulty falling asleep (worry impacts patient's sleep), difficulty staying asleep, and panic attacks (heart palpitations, SOB, sweating, shaking, chest discomfort, dizziness, fear of dying or future panic symptoms). Fluctuating weight. Recently experiencing what she believes are stress related rash/hives over the last two weeks, have reduced in severity at times but worsened again when she got upset. No SI/HI recently. One near suicide attempt in her teens (mom found her, stopped her).     Coping strategies in past included physical activity. Debo is taking lexapro, reports notable benefit; new rx for hydroxyzine for itching (has not started). Past treatment: Many yrs ago (off and on between 8 and 18 yo) for parents divorce and later depression with SI in her

## 2025-02-25 ENCOUNTER — TELEMEDICINE (OUTPATIENT)
Age: 35
End: 2025-02-25
Payer: MEDICAID

## 2025-02-25 DIAGNOSIS — F33.1 MAJOR DEPRESSIVE DISORDER, RECURRENT, MODERATE (HCC): Primary | ICD-10-CM

## 2025-02-25 DIAGNOSIS — F41.9 ANXIETY: ICD-10-CM

## 2025-02-25 PROCEDURE — 90832 PSYTX W PT 30 MINUTES: CPT | Performed by: PSYCHOLOGIST

## 2025-02-25 NOTE — PROGRESS NOTES
Behavioral Health Consultation  Dayna Brown, Ph.D.  Clinical Psychologist  2/25/2025  3:00 PM  Name: Debo Braun  YOB: 1990  PCP: Monika Matos MD     TELEHEALTH VISIT -- Audio/Visual  This is her second [facility] appointment.  Reason for Consult: Anxiety and Depression     S:  Debo is a 34 y.o. female seen for [facility] follow up visit addressing MDD moderate, Anxiety. Reports similar mood and stressors between visits. Rash/hives are nearly gone - using topical cream and tried hydroxyzine 1x - the next morning felt 'lagging' but wonders if she did not have enough sleep with her kids waking during the night.     She shares additional information about hx and symptoms today: She mentions the person she believed was her dad was not, learned this after his death. She also cleaned up after her sister's death involving a fall \"so others didn't have to.\" She prioritizes how others feel over her own, \"I don't have time to break down.\" Describes others noticing 'dissociation' at times (describes feeling blank, shut down when this occurs) and poor memory of conversations. Discussed function it serves. Describes internalizing. She also describes some recent anger outbursts (screaming and/or crying) often triggered by feeling overstimulating when already irritable.      O:  MSE:  Appearance: good hygiene   Attitude: cooperative and friendly  Consciousness: alert  Orientation: oriented to person, place, time, general circumstance  Memory: recent and remote memory intact  Attention/Concentration: intact during session  Psychomotor Activity: normal  Eye Contact: normal  Speech: normal rate and volume, well-articulated  Mood: anxious, dysthymic  Affect: congruent  Perception: within normal limits  Thought Content: within normal limits  Thought Process: logical, coherent and goal-directed  Insight: good  Judgment: intact  Ability to understand instructions: Yes  Ability to respond meaningfully:

## 2025-03-04 ENCOUNTER — TELEMEDICINE (OUTPATIENT)
Age: 35
End: 2025-03-04
Payer: MEDICAID

## 2025-03-04 DIAGNOSIS — F41.9 ANXIETY: ICD-10-CM

## 2025-03-04 DIAGNOSIS — F33.1 MAJOR DEPRESSIVE DISORDER, RECURRENT, MODERATE (HCC): Primary | ICD-10-CM

## 2025-03-04 PROCEDURE — 90832 PSYTX W PT 30 MINUTES: CPT | Performed by: PSYCHOLOGIST

## 2025-03-04 NOTE — PROGRESS NOTES
Behavioral Health Consultation  Dayna Brown, Ph.D.  Clinical Psychologist  3/4/2025  2:30 PM  Name: Debo Braun  YOB: 1990  PCP: Monika Matos MD     TELEHEALTH VISIT -- Audio/Visual  This is her third [facility] appointment.  Reason for Consult: Depression and Anxiety     S:  Debo is a 34 y.o. female seen for [facility] follow up visit addressing MDD moderate, Anxiety.  Debo describes more acute upset today after estranged  was out late last night, she woke him to help with child getting on the bus but he did not return to relieve her on time for her to get to work. Describes longstanding pattern of \"narcissistic\" behavior and perceived efforts to upset her, disrupt her plans, or impact her ability to obtain independent housing. Her workplace is supportive and flexible and working with a friend has been helpful. Explored distress tolerance techniques for use today.    Notes benefit from topical cream and tried hydroxyzine for what she believes are stress-induced hives; this understanding has been calming as well.     O:  MSE:  Appearance: good hygiene   Attitude: cooperative and tearful  Consciousness: alert  Orientation: oriented to person, place, time, general circumstance  Memory: recent and remote memory intact  Attention/Concentration: intact during session  Psychomotor Activity: normal  Eye Contact: normal  Speech: normal rate and volume, well-articulated  Mood: anxious, dysthymic  Affect: congruent to topic, tearful at times  Perception: within normal limits  Thought Content: within normal limits  Thought Process: logical, coherent and goal-directed  Insight: good  Judgment: intact  Ability to understand instructions: Yes  Ability to respond meaningfully: Yes  Morbid Ideation: no   Suicide Assessment: no suicidal ideation, plan, or intent  Homicidal Ideation: no    History:  Social History:   Social History     Socioeconomic History    Marital status:      Spouse

## 2025-03-06 ENCOUNTER — TELEPHONE (OUTPATIENT)
Dept: OTHER | Age: 35
End: 2025-03-06

## 2025-03-06 NOTE — TELEPHONE ENCOUNTER
[facility]      CHW Doug Magdaleno made 1st attempt to contact Mrs. Braun. I left a message for patient to contact CHW to assess for possible needs.       CHW will follow-up in one week with a 2nd attempt to reach patient if there is no response to this attempt.

## 2025-03-11 ENCOUNTER — TELEMEDICINE (OUTPATIENT)
Age: 35
End: 2025-03-11
Payer: MEDICAID

## 2025-03-11 DIAGNOSIS — F41.9 ANXIETY: ICD-10-CM

## 2025-03-11 DIAGNOSIS — F33.1 MAJOR DEPRESSIVE DISORDER, RECURRENT, MODERATE (HCC): Primary | ICD-10-CM

## 2025-03-11 PROCEDURE — 90832 PSYTX W PT 30 MINUTES: CPT | Performed by: PSYCHOLOGIST

## 2025-03-11 NOTE — PROGRESS NOTES
Behavioral Health Consultation  Dayna Brown, Ph.D.  Clinical Psychologist  3/11/2025  2:30 PM  Name: Debo Braun  YOB: 1990  PCP: Monika Matos MD     TELEHEALTH VISIT -- Audio/Visual  This is her fourth [facility] appointment.  Reason for Consult: Depression and Anxiety     S:  Debo is a 34 y.o. female seen for [facility] follow up visit addressing MDD moderate, Anxiety.  Recent mood similar between visits, anxious/irritable. Similar stressors related to living with ex-partner she describes as narcissistic and patterns of his \"bait and switch\" behaviors. She's choosing her battles, asking herself what she really needs that day. Describes healthy boundaries with current boyfriend who is recently unemployed as well. Discussed enabling and progress she has made to reduce enabling with partners and friends. 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         O:  MSE:  Appearance: good hygiene   Attitude: cooperative and tearful  Consciousness: alert  Orientation: oriented to person, place, time, general circumstance  Memory: recent and remote memory intact  Attention/Concentration: intact during session  Psychomotor Activity: normal  Eye Contact: normal  Speech: normal rate and volume, well-articulated  Mood: anxious, dysthymic  Affect: congruent to topic, tearful at times  Perception: within normal limits  Thought Content: within normal limits  Thought Process: logical, coherent and goal-directed  Insight: good  Judgment: intact  Ability to understand instructions: Yes  Ability to respond meaningfully: Yes  Morbid Ideation: no   Suicide Assessment: no suicidal ideation, plan, or intent  Homicidal Ideation: no    History:  Social History:   Social History     Socioeconomic History    Marital status:      Spouse name: Cullen Braun but

## 2025-03-13 ENCOUNTER — TELEPHONE (OUTPATIENT)
Dept: OTHER | Age: 35
End: 2025-03-13

## 2025-03-13 NOTE — TELEPHONE ENCOUNTER
[facility]        CHW Doug Magdaleno made 2nd attempt to contact Mrs. Braun. I left a message for patient to contact CHW to assess for possible needs.

## 2025-03-18 ENCOUNTER — TELEMEDICINE (OUTPATIENT)
Age: 35
End: 2025-03-18

## 2025-03-18 DIAGNOSIS — F41.9 ANXIETY: ICD-10-CM

## 2025-03-18 DIAGNOSIS — F33.1 MAJOR DEPRESSIVE DISORDER, RECURRENT, MODERATE (HCC): Primary | ICD-10-CM

## 2025-03-18 NOTE — PROGRESS NOTES
Behavioral Health Consultation  Dayna Brown, Ph.D.  Clinical Psychologist  3/18/2025  3:00 PM  Name: Debo Braun  YOB: 1990  PCP: Monika Matos MD     TELEHEALTH VISIT -- Audio/Visual  This is her fifth [facility] appointment.  Reason for Consult: Depression and Anxiety     S:  eDbo is a 34 y.o. female seen for [facility] follow up visit addressing MDD moderate, Anxiety. Describes slight improvement in mood between visits - she's working extra hours, finding this helpful. All 3 kids got the stomach flu, estranged  has been watching them, their interactions have been more limited this week. She got audiobook, Splitting (on  from someone with narcissistic personality), has started and relates to the experience described. Discussed common patterns/cycles in 's bx - potential benefit of considering individual interactions more in context of this cycle when considering a response.  She values being civil around the children and pursuing independent housing when possible - while also experiencing nervousness re: living on her own after 14 yr marriage and grief of this relationship ending.    She is motivated for more intensive therapy with [facility], will call them back to schedule today.     O:  MSE:  Appearance: good hygiene   Attitude: cooperative and tearful  Consciousness: alert  Orientation: oriented to person, place, time, general circumstance  Memory: recent and remote memory intact  Attention/Concentration: intact during session  Psychomotor Activity: normal  Eye Contact: normal  Speech: normal rate and volume, well-articulated  Mood: anxious, dysthymic  Affect: congruent to topic, tearful at times  Perception: within normal limits  Thought Content: within normal limits  Thought Process: logical, coherent and goal-directed  Insight: good  Judgment: intact  Ability to understand instructions: Yes  Ability to respond meaningfully: Yes  Morbid Ideation: no   Suicide